# Patient Record
Sex: MALE | Race: WHITE | URBAN - METROPOLITAN AREA
[De-identification: names, ages, dates, MRNs, and addresses within clinical notes are randomized per-mention and may not be internally consistent; named-entity substitution may affect disease eponyms.]

---

## 2017-12-27 ENCOUNTER — HOSPITAL ENCOUNTER (OUTPATIENT)
Dept: LAB | Age: 37
Discharge: HOME | End: 2017-12-27
Payer: COMMERCIAL

## 2017-12-27 ENCOUNTER — OFFICE VISIT (OUTPATIENT)
Dept: INTERNAL MEDICINE CLINIC | Age: 37
End: 2017-12-27

## 2017-12-27 VITALS
DIASTOLIC BLOOD PRESSURE: 84 MMHG | WEIGHT: 225 LBS | SYSTOLIC BLOOD PRESSURE: 124 MMHG | RESPIRATION RATE: 16 BRPM | BODY MASS INDEX: 34.1 KG/M2 | OXYGEN SATURATION: 95 % | HEART RATE: 64 BPM | TEMPERATURE: 97.4 F | HEIGHT: 68 IN

## 2017-12-27 DIAGNOSIS — E78.2 MIXED HYPERLIPIDEMIA: ICD-10-CM

## 2017-12-27 DIAGNOSIS — Z00.00 PHYSICAL EXAM, ANNUAL: ICD-10-CM

## 2017-12-27 DIAGNOSIS — Z00.00 PHYSICAL EXAM, ANNUAL: Primary | ICD-10-CM

## 2017-12-27 DIAGNOSIS — Z23 ENCOUNTER FOR IMMUNIZATION: ICD-10-CM

## 2017-12-27 DIAGNOSIS — E66.9 OBESITY (BMI 30.0-34.9): ICD-10-CM

## 2017-12-27 DIAGNOSIS — L98.9 SKIN LESION OF FACE: ICD-10-CM

## 2017-12-27 LAB
ALBUMIN SERPL-MCNC: 3.9 G/DL (ref 3.4–5)
ALBUMIN/GLOB SERPL: 1.2 {RATIO} (ref 0.8–1.7)
ALP SERPL-CCNC: 52 U/L (ref 45–117)
ALT SERPL-CCNC: 78 U/L (ref 16–61)
ANION GAP SERPL CALC-SCNC: 8 MMOL/L (ref 3–18)
APPEARANCE UR: CLEAR
AST SERPL-CCNC: 37 U/L (ref 15–37)
BILIRUB SERPL-MCNC: 0.4 MG/DL (ref 0.2–1)
BILIRUB UR QL: NEGATIVE
BUN SERPL-MCNC: 14 MG/DL (ref 7–18)
BUN/CREAT SERPL: 14 (ref 12–20)
CALCIUM SERPL-MCNC: 8.8 MG/DL (ref 8.5–10.1)
CHLORIDE SERPL-SCNC: 105 MMOL/L (ref 100–108)
CHOLEST SERPL-MCNC: 258 MG/DL
CO2 SERPL-SCNC: 27 MMOL/L (ref 21–32)
COLOR UR: YELLOW
CREAT SERPL-MCNC: 1 MG/DL (ref 0.6–1.3)
ERYTHROCYTE [DISTWIDTH] IN BLOOD BY AUTOMATED COUNT: 12.6 % (ref 11.6–14.5)
GLOBULIN SER CALC-MCNC: 3.3 G/DL (ref 2–4)
GLUCOSE SERPL-MCNC: 84 MG/DL (ref 74–99)
GLUCOSE UR STRIP.AUTO-MCNC: NEGATIVE MG/DL
HCT VFR BLD AUTO: 46.1 % (ref 36–48)
HDLC SERPL-MCNC: 55 MG/DL (ref 40–60)
HDLC SERPL: 4.7 {RATIO} (ref 0–5)
HGB BLD-MCNC: 15.9 G/DL (ref 13–16)
HGB UR QL STRIP: NEGATIVE
KETONES UR QL STRIP.AUTO: NEGATIVE MG/DL
LDLC SERPL CALC-MCNC: 176.6 MG/DL (ref 0–100)
LEUKOCYTE ESTERASE UR QL STRIP.AUTO: NEGATIVE
LIPID PROFILE,FLP: ABNORMAL
MCH RBC QN AUTO: 30.5 PG (ref 24–34)
MCHC RBC AUTO-ENTMCNC: 34.5 G/DL (ref 31–37)
MCV RBC AUTO: 88.3 FL (ref 74–97)
NITRITE UR QL STRIP.AUTO: NEGATIVE
PH UR STRIP: 5.5 [PH] (ref 5–8)
PLATELET # BLD AUTO: 223 K/UL (ref 135–420)
PMV BLD AUTO: 10.4 FL (ref 9.2–11.8)
POTASSIUM SERPL-SCNC: 4.4 MMOL/L (ref 3.5–5.5)
PROT SERPL-MCNC: 7.2 G/DL (ref 6.4–8.2)
PROT UR STRIP-MCNC: NEGATIVE MG/DL
RBC # BLD AUTO: 5.22 M/UL (ref 4.7–5.5)
SODIUM SERPL-SCNC: 140 MMOL/L (ref 136–145)
SP GR UR REFRACTOMETRY: 1.03 (ref 1–1.03)
TRIGL SERPL-MCNC: 132 MG/DL (ref ?–150)
UROBILINOGEN UR QL STRIP.AUTO: 0.2 EU/DL (ref 0.2–1)
VLDLC SERPL CALC-MCNC: 26.4 MG/DL
WBC # BLD AUTO: 6.5 K/UL (ref 4.6–13.2)

## 2017-12-27 PROCEDURE — 85027 COMPLETE CBC AUTOMATED: CPT | Performed by: INTERNAL MEDICINE

## 2017-12-27 PROCEDURE — 36415 COLL VENOUS BLD VENIPUNCTURE: CPT | Performed by: INTERNAL MEDICINE

## 2017-12-27 PROCEDURE — 80061 LIPID PANEL: CPT | Performed by: INTERNAL MEDICINE

## 2017-12-27 PROCEDURE — 81003 URINALYSIS AUTO W/O SCOPE: CPT | Performed by: INTERNAL MEDICINE

## 2017-12-27 PROCEDURE — 80053 COMPREHEN METABOLIC PANEL: CPT | Performed by: INTERNAL MEDICINE

## 2017-12-27 RX ORDER — CETIRIZINE HCL 10 MG
10 TABLET ORAL DAILY
COMMUNITY

## 2017-12-27 NOTE — PROGRESS NOTES
HISTORY OF PRESENT ILLNESS  Darryle Aspen is a 40 y.o. male. HPI Comments: 39 yo male here for CPE. No complaints other than noting skin lesion on his forehead for the past few months. Has gained some weight since last year. Feels most of that is over the holidays. No CP, SOB. DBP a little elevated on arrival. Improved with manual check. Review of Systems   Constitutional: Negative for chills, fever and weight loss. HENT: Negative for congestion and ear pain. Eyes: Negative for blurred vision and pain. Respiratory: Negative for cough and shortness of breath. Cardiovascular: Negative for chest pain, palpitations and leg swelling. Gastrointestinal: Negative for nausea and vomiting. Genitourinary: Negative for frequency and urgency. Musculoskeletal: Negative for joint pain and myalgias. Skin: Negative for itching and rash. Neurological: Negative for dizziness, tingling and headaches. Psychiatric/Behavioral: Negative for depression. The patient is not nervous/anxious. Past Medical History:   Diagnosis Date    Elevated LDL cholesterol level     Tinea corporis      Current Outpatient Prescriptions on File Prior to Visit   Medication Sig Dispense Refill    fexofenadine (ALLEGRA) 180 mg tablet Take 1 Tab by mouth daily. 90 Tab 1     No current facility-administered medications on file prior to visit. Social History   Substance Use Topics    Smoking status: Never Smoker    Smokeless tobacco: Never Used    Alcohol use 1.2 oz/week     2 Cans of beer per week   No Known Allergies  Family History   Problem Relation Age of Onset    No Known Problems Mother     High Cholesterol Father      Physical Exam   Constitutional: He appears well-developed and well-nourished. No distress.    /84 (BP 1 Location: Right arm, BP Patient Position: Sitting)  Pulse 64  Temp 97.4 °F (36.3 °C) (Oral)   Resp 16  Ht 5' 8\" (1.727 m)  Wt 225 lb (102.1 kg)  SpO2 95%  BMI 34.21 kg/m2     HENT: Right Ear: Tympanic membrane, external ear and ear canal normal.   Left Ear: Tympanic membrane, external ear and ear canal normal.   Nose: No mucosal edema or rhinorrhea. Right sinus exhibits no maxillary sinus tenderness and no frontal sinus tenderness. Left sinus exhibits no maxillary sinus tenderness and no frontal sinus tenderness. Mouth/Throat: No oropharyngeal exudate or posterior oropharyngeal erythema. Eyes: Conjunctivae and EOM are normal. Pupils are equal, round, and reactive to light. Right eye exhibits no discharge. Left eye exhibits no discharge. No scleral icterus. Neck: Neck supple. Cardiovascular: Normal rate, regular rhythm and normal heart sounds. Exam reveals no gallop and no friction rub. No murmur heard. Pulmonary/Chest: Effort normal and breath sounds normal. No respiratory distress. He has no wheezes. He has no rales. Abdominal: Soft. He exhibits no distension. There is no tenderness. There is no rebound and no guarding. Musculoskeletal: He exhibits no edema or tenderness. Lymphadenopathy:     He has no cervical adenopathy. Neurological: He is alert. He has normal reflexes. He exhibits normal muscle tone. Skin: Skin is warm and dry. Psychiatric: He has a normal mood and affect. His behavior is normal.     Lab Results   Component Value Date/Time    Sodium 141 08/17/2016 08:20 AM    Potassium 4.3 08/17/2016 08:20 AM    Chloride 106 08/17/2016 08:20 AM    CO2 26 08/17/2016 08:20 AM    Anion gap 9 08/17/2016 08:20 AM    Glucose 93 08/17/2016 08:20 AM    BUN 15 08/17/2016 08:20 AM    Creatinine 1.12 08/17/2016 08:20 AM    BUN/Creatinine ratio 13 08/17/2016 08:20 AM    GFR est AA >60 08/17/2016 08:20 AM    GFR est non-AA >60 08/17/2016 08:20 AM    Calcium 8.6 08/17/2016 08:20 AM    Bilirubin, total 0.4 08/17/2016 08:20 AM    AST (SGOT) 27 08/17/2016 08:20 AM    Alk.  phosphatase 61 08/17/2016 08:20 AM    Protein, total 7.0 08/17/2016 08:20 AM    Albumin 3.8 08/17/2016 08:20 AM    Globulin 3.2 08/17/2016 08:20 AM    A-G Ratio 1.2 08/17/2016 08:20 AM    ALT (SGPT) 58 08/17/2016 08:20 AM     Lab Results   Component Value Date/Time    Cholesterol, total 245 11/21/2016 10:22 AM    HDL Cholesterol 60 11/21/2016 10:22 AM    LDL, calculated 159.4 11/21/2016 10:22 AM    VLDL, calculated 25.6 11/21/2016 10:22 AM    Triglyceride 128 11/21/2016 10:22 AM    CHOL/HDL Ratio 4.1 11/21/2016 10:22 AM     ASSESSMENT and PLAN    ICD-10-CM ICD-9-CM    1. Physical exam, annual Z00.00 V70.0 CBC W/O DIFF      METABOLIC PANEL, COMPREHENSIVE      LIPID PANEL      URINALYSIS W/ RFLX MICROSCOPIC   2. Encounter for immunization Z23 V03.89 INFLUENZA VIRUS VAC QUAD,SPLIT,PRESV FREE SYRINGE IM   3. Mixed hyperlipidemia E78.2 272.2    4. Obesity (BMI 30.0-34. 9) E66.9 278.00    5. Skin lesion of face L98.9 709.9 REFERRAL TO DERMATOLOGY     Repeat labs today. Discussed lifestyle changes, weight loss. FLu shot today. Referral entered for dermatology to evaluate forehead lesion.

## 2017-12-27 NOTE — PROGRESS NOTES
Henny Ha presents today for   Chief Complaint   Patient presents with    Complete Physical       Henny Ha preferred language for health care discussion is english/other. Is someone accompanying this pt? no    Is the patient using any DME equipment during OV? no    Depression Screening:  PHQ over the last two weeks 12/27/2017 12/15/2016 11/17/2016 8/15/2016   Little interest or pleasure in doing things Not at all Not at all Not at all Not at all   Feeling down, depressed or hopeless Not at all Not at all Not at all Not at all   Total Score PHQ 2 0 0 0 0       Learning Assessment:  Learning Assessment 8/15/2016   PRIMARY LEARNER Patient   HIGHEST LEVEL OF EDUCATION - PRIMARY LEARNER  4 YEARS [facility] PRIMARY LEARNER NONE   CO-LEARNER CAREGIVER No   PRIMARY LANGUAGE ENGLISH    NEED No   LEARNER PREFERENCE PRIMARY DEMONSTRATION   LEARNING SPECIAL TOPICS no   ANSWERED BY najmanet   RELATIONSHIP SELF   ASSESSMENT COMMENT none       Abuse Screening:  No flowsheet data found. Fall Risk  No flowsheet data found. Health Maintenance reviewed and discussed per provider. Yes    Henny Ha is due for TDAP vax, influenza vax. Please order/place referral if appropriate. Advance Directive:  1. Do you have an advance directive in place? Patient Reply: no    2. If not, would you like material regarding how to put one in place? Patient Reply: no    Coordination of Care:  1. Have you been to the ER, urgent care clinic since your last visit? Hospitalized since your last visit? no    2. Have you seen or consulted any other health care providers outside of the [address] [place] [address] since your last visit? Include any pap smears or colon screening. no      Immunization History   Administered Date(s) Administered    Influenza Vaccine (Quad) PF 12/27/2017     Immunization administered 12/27/2017 by Preston Fuentes LPN with pt's consent. Patient tolerated procedure well.   Pt. Observed for 10 mins. No reactions noted/reported.  VIS given

## 2017-12-27 NOTE — PATIENT INSTRUCTIONS
Body Mass Index: Care Instructions  Your Care Instructions    Body mass index (BMI) can help you see if your weight is raising your risk for health problems. It uses a formula to compare how much you weigh with how tall you are. · A BMI lower than 18.5 is considered underweight. · A BMI between 18.5 and 24.9 is considered healthy. · A BMI between 25 and 29.9 is considered overweight. A BMI of 30 or higher is considered obese. If your BMI is in the normal range, it means that you have a lower risk for weight-related health problems. If your BMI is in the overweight or obese range, you may be at increased risk for weight-related health problems, such as high blood pressure, heart disease, stroke, arthritis or joint pain, and diabetes. If your BMI is in the underweight range, you may be at increased risk for health problems such as fatigue, lower protection (immunity) against illness, muscle loss, bone loss, hair loss, and hormone problems. BMI is just one measure of your risk for weight-related health problems. You may be at higher risk for health problems if you are not active, you eat an unhealthy diet, or you drink too much alcohol or use tobacco products. Follow-up care is a key part of your treatment and safety. Be sure to make and go to all appointments, and call your doctor if you are having problems. It's also a good idea to know your test results and keep a list of the medicines you take. How can you care for yourself at home? · Practice healthy eating habits. This includes eating plenty of fruits, vegetables, whole grains, lean protein, and low-fat dairy. · If your doctor recommends it, get more exercise. Walking is a good choice. Bit by bit, increase the amount you walk every day. Try for at least 30 minutes on most days of the week. · Do not smoke. Smoking can increase your risk for health problems. If you need help quitting, talk to your doctor about stop-smoking programs and medicines. These can increase your chances of quitting for good. · Limit alcohol to 2 drinks a day for men and 1 drink a day for women. Too much alcohol can cause health problems. If you have a BMI higher than 25  · Your doctor may do other tests to check your risk for weight-related health problems. This may include measuring the distance around your waist. A waist measurement of more than 40 inches in men or 35 inches in women can increase the risk of weight-related health problems. · Talk with your doctor about steps you can take to stay healthy or improve your health. You may need to make lifestyle changes to lose weight and stay healthy, such as changing your diet and getting regular exercise. If you have a BMI lower than 18.5  · Your doctor may do other tests to check your risk for health problems. · Talk with your doctor about steps you can take to stay healthy or improve your health. You may need to make lifestyle changes to gain or maintain weight and stay healthy, such as getting more healthy foods in your diet and doing exercises to build muscle. Where can you learn more? Go to http://marie-moraima.info/. Enter S176 in the search box to learn more about \"Body Mass Index: Care Instructions. \"  Current as of: October 13, 2016  Content Version: 11.4  © 7757-7105 Healthwise, Incorporated. Care instructions adapted under license by [employer] (which disclaims liability or warranty for this information). If you have questions about a medical condition or this instruction, always ask your healthcare professional. [place] [address] any warranty or liability for your use of this information. Starting a Weight Loss Plan: Care Instructions  Your Care Instructions    If you are thinking about losing weight, it can be hard to know where to start. Your doctor can help you set up a weight loss plan that best meets your needs.  You may want to take a class on nutrition or exercise, or join a weight loss support group. If you have questions about how to make changes to your eating or exercise habits, ask your doctor about seeing a registered dietitian or an exercise specialist.  It can be a big challenge to lose weight. But you do not have to make huge changes at once. Make small changes, and stick with them. When those changes become habit, add a few more changes. If you do not think you are ready to make changes right now, try to pick a date in the future. Make an appointment to see your doctor to discuss whether the time is right for you to start a plan. Follow-up care is a key part of your treatment and safety. Be sure to make and go to all appointments, and call your doctor if you are having problems. It's also a good idea to know your test results and keep a list of the medicines you take. How can you care for yourself at home? · Set realistic goals. Many people expect to lose much more weight than is likely. A weight loss of 5% to 10% of your body weight may be enough to improve your health. · Get family and friends involved to provide support. Talk to them about why you are trying to lose weight, and ask them to help. They can help by participating in exercise and having meals with you, even if they may be eating something different. · Find what works best for you. If you do not have time or do not like to cook, a program that offers meal replacement bars or shakes may be better for you. Or if you like to prepare meals, finding a plan that includes daily menus and recipes may be best.  · Ask your doctor about other health professionals who can help you achieve your weight loss goals. ¨ A dietitian can help you make healthy changes in your diet.   ¨ An exercise specialist or  can help you develop a safe and effective exercise program.  ¨ A counselor or psychiatrist can help you cope with issues such as depression, anxiety, or family problems that can make it hard to focus on weight loss. · Consider joining a support group for people who are trying to lose weight. Your doctor can suggest groups in your area. Where can you learn more? Go to http://marie-moraima.info/. Enter B791 in the search box to learn more about \"Starting a Weight Loss Plan: Care Instructions. \"  Current as of: October 13, 2016  Content Version: 11.4  © 8559-6775 [employer]. Care instructions adapted under license by [employer] (which disclaims liability or warranty for this information). If you have questions about a medical condition or this instruction, always ask your healthcare professional. [address] any warranty or liability for your use of this information.

## 2017-12-27 NOTE — MR AVS SNAPSHOT
Visit Information Date & Time Provider Department Dept. Phone Encounter #  
 12/27/2017  8:30 AM Mary Alcantara LATTO [phone] [id number] Follow-up Instructions Return in about 1 year (around 12/27/2018), or if symptoms worsen or fail to improve. Upcoming Health Maintenance Date Due DTaP/Tdap/Td series (1 - Tdap) 5/4/2002 Allergies as of 12/27/2017  Review Complete On: 12/27/2017 By: Nohemi Olivares LPN No Known Allergies Current Immunizations  Never Reviewed Name Date Influenza Vaccine (Quad) PF  Incomplete Not reviewed this visit You Were Diagnosed With   
  
 Codes Comments Physical exam, annual    -  Primary ICD-10-CM: Z00.00 ICD-9-CM: V70.0 Encounter for immunization     ICD-10-CM: C20 ICD-9-CM: V03.89 Mixed hyperlipidemia     ICD-10-CM: E78.2 ICD-9-CM: 272.2 Obesity (BMI 30.0-34.9)     ICD-10-CM: M24.5 ICD-9-CM: 278.00 Skin lesion of face     ICD-10-CM: L98.9 ICD-9-CM: 709.9 Vitals BP Pulse Temp Resp Height(growth percentile) Weight(growth percentile) 127/90 (BP 1 Location: Right arm, BP Patient Position: Sitting) 64 97.4 °F (36.3 °C) (Oral) 16 5' 8\" (1.727 m) 225 lb (102.1 kg) SpO2 BMI Smoking Status 95% 34.21 kg/m2 Never Smoker Vitals History BMI and BSA Data Body Mass Index Body Surface Area  
 34.21 kg/m 2 2.21 m 2 Preferred Pharmacy Pharmacy Name Phone [id number], [address] [facility] [place] [phone] Your Updated Medication List  
  
   
This list is accurate as of: 12/27/17  9:13 AM.  Always use your most recent med list.  
  
  
  
  
 cetirizine 5 mg tablet Commonly known as:  ZYRTEC Take  by mouth. fexofenadine 180 mg tablet Commonly known as:  Carletha Nipper Take 1 Tab by mouth daily. We Performed the Following INFLUENZA VIRUS VAC QUAD,SPLIT,PRESV FREE SYRINGE IM [id number] CPT(R)] REFERRAL TO DERMATOLOGY [REF19 Custom] Comments:  
 Please evaluate patient for skin lesion, forehead. Follow-up Instructions Return in about 1 year (around 12/27/2018), or if symptoms worsen or fail to improve. To-Do List   
 12/27/2017 Lab:  CBC W/O DIFF   
  
 12/27/2017 Lab:  LIPID PANEL   
  
 12/27/2017 Lab:  METABOLIC PANEL, COMPREHENSIVE   
  
 12/27/2017 Lab:  URINALYSIS W/ RFLX MICROSCOPIC Referral Information Referral ID Referred By Referred To  
  
 [id number] Claudia RAMIREZ MD   
   [address] [facility] [place] Phone: [phone] Fax: [phone] Visits Status Start Date End Date 1 New Request 12/27/17 12/27/18 If your referral has a status of pending review or denied, additional information will be sent to support the outcome of this decision. Patient Instructions Body Mass Index: Care Instructions Your Care Instructions Body mass index (BMI) can help you see if your weight is raising your risk for health problems. It uses a formula to compare how much you weigh with how tall you are. · A BMI lower than 18.5 is considered underweight. · A BMI between 18.5 and 24.9 is considered healthy. · A BMI between 25 and 29.9 is considered overweight. A BMI of 30 or higher is considered obese. If your BMI is in the normal range, it means that you have a lower risk for weight-related health problems. If your BMI is in the overweight or obese range, you may be at increased risk for weight-related health problems, such as high blood pressure, heart disease, stroke, arthritis or joint pain, and diabetes.  If your BMI is in the underweight range, you may be at increased risk for health problems such as fatigue, lower protection (immunity) against illness, muscle loss, bone loss, hair loss, and hormone problems. BMI is just one measure of your risk for weight-related health problems. You may be at higher risk for health problems if you are not active, you eat an unhealthy diet, or you drink too much alcohol or use tobacco products. Follow-up care is a key part of your treatment and safety. Be sure to make and go to all appointments, and call your doctor if you are having problems. It's also a good idea to know your test results and keep a list of the medicines you take. How can you care for yourself at home? · Practice healthy eating habits. This includes eating plenty of fruits, vegetables, whole grains, lean protein, and low-fat dairy. · If your doctor recommends it, get more exercise. Walking is a good choice. Bit by bit, increase the amount you walk every day. Try for at least 30 minutes on most days of the week. · Do not smoke. Smoking can increase your risk for health problems. If you need help quitting, talk to your doctor about stop-smoking programs and medicines. These can increase your chances of quitting for good. · Limit alcohol to 2 drinks a day for men and 1 drink a day for women. Too much alcohol can cause health problems. If you have a BMI higher than 25 · Your doctor may do other tests to check your risk for weight-related health problems. This may include measuring the distance around your waist. A waist measurement of more than 40 inches in men or 35 inches in women can increase the risk of weight-related health problems. · Talk with your doctor about steps you can take to stay healthy or improve your health. You may need to make lifestyle changes to lose weight and stay healthy, such as changing your diet and getting regular exercise. If you have a BMI lower than 18.5 · Your doctor may do other tests to check your risk for health problems. · Talk with your doctor about steps you can take to stay healthy or improve your health.  You may need to make lifestyle changes to gain or maintain weight and stay healthy, such as getting more healthy foods in your diet and doing exercises to build muscle. Where can you learn more? Go to http://marie-moraima.info/. Enter S176 in the search box to learn more about \"Body Mass Index: Care Instructions. \" Current as of: October 13, 2016 Content Version: 11.4 © 7059-6389 [employer]. Care instructions adapted under license by [employer] (which disclaims liability or warranty for this information). If you have questions about a medical condition or this instruction, always ask your healthcare professional. [address] any warranty or liability for your use of this information. Starting a Weight Loss Plan: Care Instructions Your Care Instructions If you are thinking about losing weight, it can be hard to know where to start. Your doctor can help you set up a weight loss plan that best meets your needs. You may want to take a class on nutrition or exercise, or join a weight loss support group. If you have questions about how to make changes to your eating or exercise habits, ask your doctor about seeing a registered dietitian or an exercise specialist. 
It can be a big challenge to lose weight. But you do not have to make huge changes at once. Make small changes, and stick with them. When those changes become habit, add a few more changes. If you do not think you are ready to make changes right now, try to pick a date in the future. Make an appointment to see your doctor to discuss whether the time is right for you to start a plan. Follow-up care is a key part of your treatment and safety. Be sure to make and go to all appointments, and call your doctor if you are having problems. It's also a good idea to know your test results and keep a list of the medicines you take. How can you care for yourself at home? · Set realistic goals. Many people expect to lose much more weight than is likely. A weight loss of 5% to 10% of your body weight may be enough to improve your health. · Get family and friends involved to provide support. Talk to them about why you are trying to lose weight, and ask them to help. They can help by participating in exercise and having meals with you, even if they may be eating something different. · Find what works best for you. If you do not have time or do not like to cook, a program that offers meal replacement bars or shakes may be better for you. Or if you like to prepare meals, finding a plan that includes daily menus and recipes may be best. 
· Ask your doctor about other health professionals who can help you achieve your weight loss goals. ¨ A dietitian can help you make healthy changes in your diet. ¨ An exercise specialist or  can help you develop a safe and effective exercise program. 
¨ A counselor or psychiatrist can help you cope with issues such as depression, anxiety, or family problems that can make it hard to focus on weight loss. · Consider joining a support group for people who are trying to lose weight. Your doctor can suggest groups in your area. Where can you learn more? Go to http://marieQiyou Interaction Networkmoraima.info/. Enter O562 in the search box to learn more about \"Starting a Weight Loss Plan: Care Instructions. \" Current as of: October 13, 2016 Content Version: 11.4 © 9886-6109 Healthwise, Incorporated. Care instructions adapted under license by [employer] (which disclaims liability or warranty for this information). If you have questions about a medical condition or this instruction, always ask your healthcare professional. [place] [address] any warranty or liability for your use of this information. Introducing [facility]! Dear Estrella Chowdhury: Thank you for requesting a RelateIQ account. Our records indicate that you already have an active RelateIQ account. You can access your account anytime at [email or web address] Did you know that you can access your hospital and ER discharge instructions at any time in RelateIQ? You can also review all of your test results from your hospital stay or ER visit. Additional Information If you have questions, please visit the Frequently Asked Questions section of the RelateIQ website at [email or web address] Remember, RelateIQ is NOT to be used for urgent needs. For medical emergencies, dial 911. Now available from your iPhone and Android! Please provide this summary of care documentation to your next provider. If you have any questions after today's visit, please call [phone].

## 2018-12-27 ENCOUNTER — OFFICE VISIT (OUTPATIENT)
Dept: INTERNAL MEDICINE CLINIC | Age: 38
End: 2018-12-27

## 2018-12-27 ENCOUNTER — HOSPITAL ENCOUNTER (OUTPATIENT)
Dept: LAB | Age: 38
Discharge: HOME | End: 2018-12-27
Payer: COMMERCIAL

## 2018-12-27 VITALS
TEMPERATURE: 97.6 F | WEIGHT: 225.2 LBS | RESPIRATION RATE: 18 BRPM | HEART RATE: 66 BPM | DIASTOLIC BLOOD PRESSURE: 71 MMHG | OXYGEN SATURATION: 97 % | HEIGHT: 68 IN | BODY MASS INDEX: 34.13 KG/M2 | SYSTOLIC BLOOD PRESSURE: 119 MMHG

## 2018-12-27 DIAGNOSIS — G47.9 SLEEP DISTURBANCE: ICD-10-CM

## 2018-12-27 DIAGNOSIS — J30.1 SEASONAL ALLERGIC RHINITIS DUE TO POLLEN: ICD-10-CM

## 2018-12-27 DIAGNOSIS — Z00.00 PHYSICAL EXAM: ICD-10-CM

## 2018-12-27 DIAGNOSIS — E78.2 MIXED HYPERLIPIDEMIA: ICD-10-CM

## 2018-12-27 DIAGNOSIS — Z00.00 PHYSICAL EXAM: Primary | ICD-10-CM

## 2018-12-27 LAB
ALBUMIN SERPL-MCNC: 4 G/DL (ref 3.4–5)
ALBUMIN/GLOB SERPL: 1.3 {RATIO} (ref 0.8–1.7)
ALP SERPL-CCNC: 49 U/L (ref 45–117)
ALT SERPL-CCNC: 75 U/L (ref 16–61)
ANION GAP SERPL CALC-SCNC: 4 MMOL/L (ref 3–18)
AST SERPL-CCNC: 37 U/L (ref 15–37)
BASOPHILS # BLD: 0 K/UL (ref 0–0.1)
BASOPHILS NFR BLD: 0 % (ref 0–2)
BILIRUB SERPL-MCNC: 0.5 MG/DL (ref 0.2–1)
BUN SERPL-MCNC: 18 MG/DL (ref 7–18)
BUN/CREAT SERPL: 17 (ref 12–20)
CALCIUM SERPL-MCNC: 9.2 MG/DL (ref 8.5–10.1)
CHLORIDE SERPL-SCNC: 108 MMOL/L (ref 100–108)
CHOLEST SERPL-MCNC: 269 MG/DL
CO2 SERPL-SCNC: 27 MMOL/L (ref 21–32)
CREAT SERPL-MCNC: 1.04 MG/DL (ref 0.6–1.3)
DIFFERENTIAL METHOD BLD: NORMAL
EOSINOPHIL # BLD: 0.1 K/UL (ref 0–0.4)
EOSINOPHIL NFR BLD: 1 % (ref 0–5)
ERYTHROCYTE [DISTWIDTH] IN BLOOD BY AUTOMATED COUNT: 12.3 % (ref 11.6–14.5)
EST. AVERAGE GLUCOSE BLD GHB EST-MCNC: 114 MG/DL
GLOBULIN SER CALC-MCNC: 3.2 G/DL (ref 2–4)
GLUCOSE SERPL-MCNC: 96 MG/DL (ref 74–99)
HBA1C MFR BLD: 5.6 % (ref 4.2–5.6)
HCT VFR BLD AUTO: 45.7 % (ref 36–48)
HDLC SERPL-MCNC: 53 MG/DL (ref 40–60)
HDLC SERPL: 5.1 {RATIO} (ref 0–5)
HGB BLD-MCNC: 15.7 G/DL (ref 13–16)
LDLC SERPL CALC-MCNC: 179.8 MG/DL (ref 0–100)
LIPID PROFILE,FLP: ABNORMAL
LYMPHOCYTES # BLD: 1.3 K/UL (ref 0.9–3.6)
LYMPHOCYTES NFR BLD: 22 % (ref 21–52)
MCH RBC QN AUTO: 30.8 PG (ref 24–34)
MCHC RBC AUTO-ENTMCNC: 34.4 G/DL (ref 31–37)
MCV RBC AUTO: 89.8 FL (ref 74–97)
MONOCYTES # BLD: 0.3 K/UL (ref 0.05–1.2)
MONOCYTES NFR BLD: 5 % (ref 3–10)
NEUTS SEG # BLD: 4.3 K/UL (ref 1.8–8)
NEUTS SEG NFR BLD: 72 % (ref 40–73)
PLATELET # BLD AUTO: 218 K/UL (ref 135–420)
PMV BLD AUTO: 10.6 FL (ref 9.2–11.8)
POTASSIUM SERPL-SCNC: 4.6 MMOL/L (ref 3.5–5.5)
PROT SERPL-MCNC: 7.2 G/DL (ref 6.4–8.2)
RBC # BLD AUTO: 5.09 M/UL (ref 4.7–5.5)
SODIUM SERPL-SCNC: 139 MMOL/L (ref 136–145)
TRIGL SERPL-MCNC: 181 MG/DL (ref ?–150)
TSH SERPL DL<=0.05 MIU/L-ACNC: 1.5 UIU/ML (ref 0.36–3.74)
VLDLC SERPL CALC-MCNC: 36.2 MG/DL
WBC # BLD AUTO: 6 K/UL (ref 4.6–13.2)

## 2018-12-27 PROCEDURE — 80053 COMPREHEN METABOLIC PANEL: CPT

## 2018-12-27 PROCEDURE — 84443 ASSAY THYROID STIM HORMONE: CPT

## 2018-12-27 PROCEDURE — 83036 HEMOGLOBIN GLYCOSYLATED A1C: CPT

## 2018-12-27 PROCEDURE — 85025 COMPLETE CBC W/AUTO DIFF WBC: CPT

## 2018-12-27 PROCEDURE — 80061 LIPID PANEL: CPT

## 2018-12-27 RX ORDER — MONTELUKAST SODIUM 10 MG/1
10 TABLET ORAL DAILY
Qty: 60 TAB | Refills: 5 | Status: SHIPPED | OUTPATIENT
Start: 2018-12-27 | End: 2019-01-04

## 2018-12-27 NOTE — PROGRESS NOTES
ROOM # 16  Identified pt with two pt identifiers(name and ). Reviewed record in preparation for visit and have obtained necessary documentation. Chief Complaint   Patient presents with    Complete Physical      Olena Caldwell preferred language for health care discussion is english/other. Is the patient using any DME equipment during OV? NO    Franciscoapple Caldwell is due for:  Health Maintenance Due   Topic    DTaP/Tdap/Td series (1 - Tdap)    Influenza Age 5 to Adult      Health Maintenance reviewed and discussed per provider  Please order/place referral if appropriate. Advance Directive:  1. Do you have an advance directive in place? Patient Reply: NO    2. If not, would you like material regarding how to put one in place? NO    Coordination of Care:  1. Have you been to the ER, urgent care clinic since your last visit? Hospitalized since your last visit? NO    2. Have you seen or consulted any other health care providers outside of the [address] [place] [address] since your last visit? Include any pap smears or colon screening. NO    Patient is accompanied by self I have received verbal consent from Olena Caldwell to discuss any/all medical information while they are present in the room.     Learning Assessment:  Learning Assessment 8/15/2016   PRIMARY LEARNER Patient   HIGHEST LEVEL OF EDUCATION - PRIMARY LEARNER  4 YEARS OF COLLEGE   BARRIERS PRIMARY LEARNER NONE   CO-LEARNER CAREGIVER No   PRIMARY LANGUAGE ENGLISH    NEED No   LEARNER PREFERENCE PRIMARY DEMONSTRATION   LEARNING SPECIAL TOPICS no   ANSWERED BY patinet   RELATIONSHIP SELF   ASSESSMENT COMMENT none     Depression Screening:  PHQ over the last two weeks 2018 2017 12/15/2016 2016 8/15/2016   Little interest or pleasure in doing things Not at all Not at all Not at all Not at all Not at all   Feeling down, depressed, irritable, or hopeless Not at all Not at all Not at all Not at all Not at all   Total Score PHQ 2 0 0 0 0 0 Abuse Screening:  No flowsheet data found. Fall Risk  No flowsheet data found.

## 2018-12-27 NOTE — PATIENT INSTRUCTIONS

## 2018-12-27 NOTE — PROGRESS NOTES
HISTORY OF PRESENT ILLNESS  Suhas Leach is a 41 y.o. male. 40 yo male here for CPE. No complaints. Does get seasonal allergies. Does not feel zyrtec is fully effective. Allegra didn't help much. Had nasal dryness with Flonase. Has not gained weight since last year. Notes he goes to the gym regularly including weight training. Lives alone, but has noted snoring in past. Feels like he wakes himself up at times. Intermittent nonrestorative sleep. Has already received flu shot. No known FH of colon cancer. Elevated LDL on past labs. BP elevated in the past but well controlled today. Review of Systems   Constitutional: Negative for chills, fever and weight loss. HENT: Negative for congestion and ear pain. Eyes: Negative for blurred vision and pain. Respiratory: Negative for cough and shortness of breath. Cardiovascular: Negative for chest pain, palpitations and leg swelling. Gastrointestinal: Negative for nausea and vomiting. Genitourinary: Negative for frequency and urgency. Musculoskeletal: Negative for joint pain and myalgias. Skin: Negative for itching and rash. Neurological: Negative for dizziness, tingling and headaches. Psychiatric/Behavioral: Negative for depression. The patient is not nervous/anxious. Past Medical History:   Diagnosis Date    Elevated LDL cholesterol level     Tinea corporis    History reviewed. No pertinent surgical history. Current Outpatient Medications on File Prior to Visit   Medication Sig Dispense Refill    cetirizine (ZYRTEC) 5 mg tablet Take  by mouth. No current facility-administered medications on file prior to visit. No Known Allergies   Family History   Problem Relation Age of Onset    No Known Problems Mother     High Cholesterol Father      Social History     Tobacco Use    Smoking status: Never Smoker    Smokeless tobacco: Never Used   Substance Use Topics    Alcohol use:  Yes     Alcohol/week: 1.2 oz     Types: 2 Cans of beer per week    Drug use: No     Physical Exam   Constitutional: He appears well-developed and well-nourished. No distress. /71 (BP 1 Location: Right arm, BP Patient Position: Sitting)   Pulse 66   Temp 97.6 °F (36.4 °C) (Oral)   Resp 18   Ht 5' 8\" (1.727 m)   Wt 225 lb 3.2 oz (102.2 kg)   SpO2 97%   BMI 34.24 kg/m²      HENT:   Right Ear: Tympanic membrane and ear canal normal.   Left Ear: Tympanic membrane and ear canal normal.   Nose: No mucosal edema. Right sinus exhibits no maxillary sinus tenderness and no frontal sinus tenderness. Left sinus exhibits no maxillary sinus tenderness and no frontal sinus tenderness. Mallampati III   Eyes: EOM are normal. Right eye exhibits no discharge. Left eye exhibits no discharge. No scleral icterus. Neck: Neck supple. Cardiovascular: Normal rate, regular rhythm and normal heart sounds. Exam reveals no gallop and no friction rub. No murmur heard. Pulmonary/Chest: Effort normal and breath sounds normal. No respiratory distress. He has no wheezes. He has no rales. Abdominal: Soft. Bowel sounds are normal. He exhibits no distension. There is no tenderness. There is no rebound and no guarding. Musculoskeletal: He exhibits no edema or tenderness. Lymphadenopathy:     He has no cervical adenopathy. Neurological: He is alert. He exhibits normal muscle tone. Skin: Skin is warm and dry. Psychiatric: He has a normal mood and affect.  His behavior is normal.     Lab Results   Component Value Date/Time    Cholesterol, total 258 (H) 12/27/2017 09:16 AM    HDL Cholesterol 55 12/27/2017 09:16 AM    LDL, calculated 176.6 (H) 12/27/2017 09:16 AM    VLDL, calculated 26.4 12/27/2017 09:16 AM    Triglyceride 132 12/27/2017 09:16 AM    CHOL/HDL Ratio 4.7 12/27/2017 09:16 AM     Lab Results   Component Value Date/Time    Sodium 140 12/27/2017 09:16 AM    Potassium 4.4 12/27/2017 09:16 AM    Chloride 105 12/27/2017 09:16 AM    CO2 27 12/27/2017 09:16 AM Anion gap 8 12/27/2017 09:16 AM    Glucose 84 12/27/2017 09:16 AM    BUN 14 12/27/2017 09:16 AM    Creatinine 1.00 12/27/2017 09:16 AM    BUN/Creatinine ratio 14 12/27/2017 09:16 AM    GFR est AA >60 12/27/2017 09:16 AM    GFR est non-AA >60 12/27/2017 09:16 AM    Calcium 8.8 12/27/2017 09:16 AM    Bilirubin, total 0.4 12/27/2017 09:16 AM    AST (SGOT) 37 12/27/2017 09:16 AM    Alk. phosphatase 52 12/27/2017 09:16 AM    Protein, total 7.2 12/27/2017 09:16 AM    Albumin 3.9 12/27/2017 09:16 AM    Globulin 3.3 12/27/2017 09:16 AM    A-G Ratio 1.2 12/27/2017 09:16 AM    ALT (SGPT) 78 (H) 12/27/2017 09:16 AM   No results found for: HBA1C, HGBE8, MVS1OOHC, EIF8OCVZ  No results found for: TSH, TSHEXT, TSH2, TSH3, TSHP, TSHELE, TT3, T3U, T3UP, FRT3, FT3, T3T, FT4, FT4P, T4, T4P, FT4T, TT7, U4IHWHYYK, TSHEXT  Lab Results   Component Value Date/Time    WBC 6.5 12/27/2017 09:16 AM    HGB 15.9 12/27/2017 09:16 AM    HCT 46.1 12/27/2017 09:16 AM    PLATELET 652 00/97/8554 09:16 AM    MCV 88.3 12/27/2017 09:16 AM     ASSESSMENT and PLAN    ICD-10-CM ICD-9-CM    1. Physical exam Z00.00 V70.9 CBC WITH AUTOMATED DIFF      METABOLIC PANEL, COMPREHENSIVE      LIPID PANEL      TSH 3RD GENERATION      HEMOGLOBIN A1C WITH EAG   2. Sleep disturbance G47.9 780.50 SLEEP MEDICINE REFERRAL   3. Mixed hyperlipidemia H14.8 863.8 METABOLIC PANEL, COMPREHENSIVE      LIPID PANEL   4. Seasonal allergic rhinitis due to pollen J30.1 477.0    5. BMI 34.0-34.9,adult Z68.34 V85.34 TSH 3RD GENERATION     Repeat labs today. Continue with lifestyle changes. Will try adding singulair to help with allergy sx  Referral entered to sleep medicine.

## 2018-12-28 NOTE — PROGRESS NOTES
Please let him know his cholesterol levels remain elevated. Would he be interested in starting a medication such as simvastatin? Also, one of his liver enzymes remains a little elevated. He should limit alcohol intake. We can further assess his liver with an ultrasound if he is willing to do so.

## 2018-12-31 ENCOUNTER — TELEPHONE (OUTPATIENT)
Dept: INTERNAL MEDICINE CLINIC | Age: 38
End: 2018-12-31

## 2018-12-31 DIAGNOSIS — R79.89 ELEVATED LFTS: ICD-10-CM

## 2018-12-31 DIAGNOSIS — E78.5 DYSLIPIDEMIA: Primary | ICD-10-CM

## 2018-12-31 RX ORDER — SIMVASTATIN 10 MG/1
10 TABLET, FILM COATED ORAL
Qty: 30 TAB | Refills: 3 | Status: SHIPPED | OUTPATIENT
Start: 2018-12-31 | End: 2019-04-22 | Stop reason: SDUPTHER

## 2018-12-31 NOTE — TELEPHONE ENCOUNTER
2 Pt Identifiers verified. Notified pt of below result note. Verbalized understanding. Pt states he is willing to start zocor and having an US.

## 2018-12-31 NOTE — TELEPHONE ENCOUNTER
----- Message from Reji Zhang MD sent at 12/28/2018  3:04 PM EST -----  Please let him know his cholesterol levels remain elevated. Would he be interested in starting a medication such as simvastatin? Also, one of his liver enzymes remains a little elevated. He should limit alcohol intake. We can further assess his liver with an ultrasound if he is willing to do so.

## 2018-12-31 NOTE — TELEPHONE ENCOUNTER
Sent electronically:    Requested Prescriptions     Signed Prescriptions Disp Refills    simvastatin (ZOCOR) 10 mg tablet 30 Tab 3     Sig: Take 1 Tab by mouth nightly. Authorizing Provider: Mono Narayanan     Order in [facility] for RUQ ultrasound. He will need to f/u with Dr. Sae Munoz in 6 weeks to check on lipids. He needs fasting labs prior to appt. Orders in [facility].

## 2019-01-07 ENCOUNTER — TELEPHONE (OUTPATIENT)
Dept: INTERNAL MEDICINE CLINIC | Age: 39
End: 2019-01-07

## 2019-01-07 NOTE — TELEPHONE ENCOUNTER
Patient is requesting order for ultrasound be sent to [facility] center in [facility]

## 2019-01-10 ENCOUNTER — TELEPHONE (OUTPATIENT)
Dept: INTERNAL MEDICINE CLINIC | Age: 39
End: 2019-01-10

## 2019-01-10 NOTE — TELEPHONE ENCOUNTER
Patient is calling in stating the Dx center did not receive the fax, asking that it please be resent. (Please see telephone encounter from 1/7/19).

## 2019-01-11 NOTE — TELEPHONE ENCOUNTER
Outgoing call to patient this morning. Two patient identifies confirmed. Patient requesting ultrasound order be faxed to [facility] at [facility]. Outgoing call to facility. Verified fax number. Order faxed to [phone].

## 2019-03-18 ENCOUNTER — OFFICE VISIT (OUTPATIENT)
Dept: PULMONOLOGY | Age: 39
End: 2019-03-18

## 2019-03-18 VITALS
SYSTOLIC BLOOD PRESSURE: 130 MMHG | HEIGHT: 68 IN | DIASTOLIC BLOOD PRESSURE: 84 MMHG | BODY MASS INDEX: 33.49 KG/M2 | WEIGHT: 221 LBS | TEMPERATURE: 98 F | OXYGEN SATURATION: 98 % | RESPIRATION RATE: 16 BRPM | HEART RATE: 48 BPM

## 2019-03-18 DIAGNOSIS — R06.83 SNORING: ICD-10-CM

## 2019-03-18 DIAGNOSIS — E66.9 OBESITY (BMI 30-39.9): ICD-10-CM

## 2019-03-18 DIAGNOSIS — Z78.9 ALCOHOL USE: ICD-10-CM

## 2019-03-18 DIAGNOSIS — E78.5 DYSLIPIDEMIA: ICD-10-CM

## 2019-03-18 DIAGNOSIS — F45.8 BRUXISM: Primary | ICD-10-CM

## 2019-03-18 NOTE — PROGRESS NOTES
[facility]  Pulmonary, Critical Care, and Sleep Medicine    Office Progress Note- Initial Evaluation      Primary Care Physician: Trent Hubbard MD     Reason for Visit:  Evaluation for for possible sleep disorder. Assessment:  1. Snoring- Patient has symptoms and exam findings suggestive of a sleep breathing disorder. He denies fatigue but also is taking daily naps. His father has a similar build and was diagnosed with JANINA. As such additional sleep testing is indicated. 2. Hyperlipidemia  3. Bruxism- Has oral guard  4. Obesity: Body mass index is 33.6 kg/m². 5. Seasonal Allergies- controlled  6. Alcohol use: on weekends  7. Report of recurrent nosebleeds- monitor for now       Plan:    · Schedule patient for home sleep study for further evaluation. · Potential consequences of untreated sleep apnea, and/or excessive daytime sleepiness were discussed with the patient. · Educational materials provided. 8. Continue with current oral guard and follow up with dentist.  · Treatment options including CPAP, dental appliance, weight reduction measures, positional therapy, surgeries etc were discussed. - If the patient has mild to moderate JANINA he would like to explore a dental appliance before considering CPAP therapy. · Healthy lifestyle changes to include weight loss and exercise discussed. · Healthy sleep habits were reviewed and encouraged. ·  and workplace safety reviewed and discussed as appropriate. Drowsy and/or inattentive driving should be avoided. · Follow-up in after sleep study, sooner should new symptoms or problems arise. History of Present Illness: Mr. Alisa Espinoza is a 41 y.o. male patient who was referred by his PCP for possible sleep breathing disorder.   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 Motor vehicle accident(s) associated with drowsy driving:is denied by the patient     Snoring: This is a Chronic problem which has been ongoing for years. He has woke himself up snoring before but he thinks it is episodic and occur when he is really tired. Fatigue:   He states he does not feel tired but he does take daily naps. Dental: Teeth clenching or grindingis reported. He wears a nightguard. Naps: are reported. He takes 15-20 minute nap when he gets home form work. Leg Symptoms/Pain: He does not have unpleasant or crawling sensation in legs or strong urge to move when inactive. GERD: is not reported. Mood: Denies anxiety and/or depression    Sleep-Wake History:     Estimates sleeping approximately 7 hours per night/day. He gets into bed at approximately 2200. Once in bed,he typically goes to sleep but he will look at social media on occasion. It usually takes up to 30 minutes to fall asleep after going to bed. He get up to use the bathroom 0 times nightly. Occasional dream recall. He normally awakens to start their day at 0500. He typically gets out of bed at that time. denies waking up with a morning headache. denies awakening with a dry mouth. Denies symptoms suggestive of cataplexy. Denies sleep paralysis. Denies hypnagogic and/or hypnopompic hallucinations. Denies sleep talking/ walking. Denies other unusual and/or parasomnia behaviors. Family Sleep History:  Father: JANINA - uses CPAP    Stop Aman Penta 3/18/2019   Does the patient snore loudly (louder than talking or loud enough to be heard through closed doors)? 0   Does the patient often feel tired, fatigued, or sleepy during the daytime, even after a \"good\" night's sleep? 1   Has anyone ever observed the patient stop breathing during their sleep?  0   Does the patient have or are they being treated for high blood pressure? 0   Is the patient's BMI greater than 35? 0   Is your neck circumference greater than 17 inches (Male) or 16 inches (Female)? 1   Is the patient older than 48? 0   Is the patient male? 1   JANINA Score 3       3 most recent PHQ Screens 3/18/2019   Little interest or pleasure in doing things Not at all   Feeling down, depressed, irritable, or hopeless Not at all   Total Score PHQ 2 0       [place] Scale 3/18/2019   Sitting and Reading 1   Watching TV 1   Sitting, inactive in a public place (e.g. a movie theater or meeting) 0   As a passenger in a car for an hour, without a break 2   Lying down to rest in the afternoon, when circumstances permit 3   Sitting and talking to someone 0   Sitting quietly after lunch without alcohol 2   In a car, while stopped for a few minutes in traffic 0   [place] Sleepiness Score 9        Neck circ. in \"inches\": 17.5     Past Medical History:  Past Medical History:   Diagnosis Date    Elevated LDL cholesterol level     Tinea corporis        Past Surgical History:  History reviewed. No pertinent surgical history. Family History:  Family History   Problem Relation Age of Onset    No Known Problems Mother     High Cholesterol Father        Social History:    Caffeine Amount Time of last Intake Comments   Coffee 2 cups/am     Soda None     Tea None     Energy Drinks None     Over- the - counter stimulant pills None     Other Substances      Alcohol 8/ weekend  No drinking during the week   Tobacco None     Drugs None         Medications:  Current Outpatient Medications on File Prior to Visit   Medication Sig Dispense Refill    simvastatin (ZOCOR) 10 mg tablet Take 1 Tab by mouth nightly. 30 Tab 3    cetirizine (ZYRTEC) 5 mg tablet Take 5 mg by mouth daily. No current facility-administered medications on file prior to visit.          Allergy:  No Known Allergies    Review of Systems  General ROS: positive for  - sleep disturbance  negative for - chills, fatigue, fever, hot flashes, malaise, night sweats, weight gain or weight loss  ENT ROS: negative for - headaches, hearing change, nasal congestion, nasal discharge, nasal polyps, oral lesions, sinus pain, sneezing, sore throat, tinnitus, vertigo, visual changes or vocal changes, + for recurrent nose bleeds - worse in the winter time. + seasonal allergies. Hematological and Lymphatic ROS: negative for - bleeding problems, blood clots, bruising, jaundice, pallor or swollen lymph nodes  Endocrine ROS: negative for - polydipsia/polyuria, skin changes, temperature intolerance or unexpected weight changes  Respiratory ROS: no cough, shortness of breath, or wheezing  Cardiovascular ROS: no chest pain or dyspnea on exertion  Gastrointestinal ROS: no abdominal pain, change in bowel habits, or black or bloody stools  Genito-Urinary ROS: no dysuria, trouble voiding, or hematuria  Musculoskeletal ROS: negative  Neurological ROS: no TIA or stroke symptoms  Dermatological ROS: negative for - pruritus, rash or skin lesion changes   Psychological ROS: negative   Otherwise negative. Physical Exam:  Blood pressure 130/84, pulse (!) 48, temperature 98 °F (36.7 °C), temperature source Oral, resp. rate 16, height 5' 8\" (1.727 m), weight 100.2 kg (221 lb), SpO2 98 %. on RA , Body mass index is 33.6 kg/m². General: in no respiratory distress, acyanotic, appears stated age, cooperative, pleasant  HEENT: Head size proportionally small, PERRL, EOMI, throat without erythema or exudate, Tongue with  dental indention on tongue, Mallampati's score 3+, Uvula- midline, + mild retrognathia, Tonsils- 1  Neck: Supple,  no abnormally enlarged lymph nodes, thyroid is not enlarged, non-tender, No carotid bruit.  No JVD  Chest: normal  Lungs: moderate air entry, clear to auscultation bilaterally,   Heart: Regular rate and rhythm, S1S2 present, without murmur  Abdomen: Protuberant, bowel sounds normoactive, abdomen is soft without significant tenderness, or guarding  Extremity: negative for edema, cyanosis or clubbing  Skin: Skin color, texture, turgor normal. No rashes or lesions, Short nails- patient picks at them  Neurological: CN 2-12 grossly intact, normal muscle tone    Data Reviewed:  CBC:   Lab Results   Component Value Date/Time    WBC 6.0 12/27/2018 09:21 AM    HGB 15.7 12/27/2018 09:21 AM    HCT 45.7 12/27/2018 09:21 AM    PLATELET 192 91/97/3388 09:21 AM    MCV 89.8 12/27/2018 09:21 AM       BMP:   Lab Results   Component Value Date/Time    Sodium 139 12/27/2018 09:21 AM    Potassium 4.6 12/27/2018 09:21 AM    Chloride 108 12/27/2018 09:21 AM    CO2 27 12/27/2018 09:21 AM    Anion gap 4 12/27/2018 09:21 AM    Glucose 96 12/27/2018 09:21 AM    BUN 18 12/27/2018 09:21 AM    Creatinine 1.04 12/27/2018 09:21 AM    BUN/Creatinine ratio 17 12/27/2018 09:21 AM    GFR est AA >60 12/27/2018 09:21 AM    GFR est non-AA >60 12/27/2018 09:21 AM    Calcium 9.2 12/27/2018 09:21 AM        TSH:  Lab Results   Component Value Date/Time    TSH 1.50 12/27/2018 09:21 AM       Imaging:  [x]I have personally reviewed the patients radiographs section   Results from Hospital Encounter encounter on 07/01/11   XR HAND MIN 3 VIEWS RIGHT    Narrative Ordering MD: Syed Macias MD  Signed By: Jessica Manzanares MD  ** FINAL **  ---------------------------------------------------------------------  PROCEDURE DATE:  Jul 1 2011  9:09AM    Accession Number:  [id number]  Order No:   [id number]  Procedure:   RDX - HAND LT  MIN 3 VIEWS  CPT Code:   62171  Reason:   TRAUMA  INTERPRETATION:  THREE VIEW LEFT HAND:   No fracture or other acute abnormalities are demonstrated in the   hand. No radiopaque foreign bodies. IMPRESSION:   NORMAL LEFT HAND. No results found for this or any previous visit. Cardiac Echo: No Testing To Date. Historical Sleep Testing Data: No Testing To Date.           Jovani Singleton DO, [facility]  Pulmonary, Sleep and Critical Care Medicine

## 2019-03-18 NOTE — PROGRESS NOTES
Chief Complaint   Patient presents with   Ted Brewster Referral / Consult     referred by Dr. Nathan Mendoza for sleep evaluation     1. Have you been to the ER, urgent care clinic since your last visit? Hospitalized since your last visit? No    2. Have you seen or consulted any other health care providers outside of the [address] [place] [address] since your last visit? Include any pap smears or colon screening.  No

## 2019-04-08 ENCOUNTER — HOSPITAL ENCOUNTER (OUTPATIENT)
Dept: SLEEP MEDICINE | Age: 39
Discharge: HOME | End: 2019-04-08
Payer: COMMERCIAL

## 2019-04-08 DIAGNOSIS — E78.5 DYSLIPIDEMIA: ICD-10-CM

## 2019-04-08 DIAGNOSIS — F45.8 BRUXISM: ICD-10-CM

## 2019-04-08 DIAGNOSIS — R06.83 SNORING: ICD-10-CM

## 2019-04-08 DIAGNOSIS — E66.9 OBESITY (BMI 30-39.9): ICD-10-CM

## 2019-04-08 PROCEDURE — 95806 SLEEP STUDY UNATT&RESP EFFT: CPT

## 2019-04-08 NOTE — PROGRESS NOTES
Patient Instructions  Should you need assistance after arriving home before 4:00 p.m. on a week day:  please call the sleep center at [phone]. When you are ready for bed and to begin the study, please follow these instructions:    1. Apply the device and components as demonstrated on the instruction sheet provided. 2. Apply respiratory belt and device around chest, located at nipple line and tighten to a snug fit. 3. Attach the nasal cannula filter to the port on the side of device and then place prongs in the nostrils and around ears, tightening the slider for a more secure fit. Be sure to use a medical tape to secure the plastic tubing to the patients cheeks. 4. Place the pulse Ox probe on the pointer figure. 5. Press the on button located on the device to initiate recording. Note 3 corners (refer to patient instructions) of the device should have a green light which will illuminate if sensors are functioning properly. 6. On the Home Study Activity Log (located below), document the date and time you are starting your study. Activity should be In Bed. 7. Remember 8-9 hours of recording (bed-time) with at least 6 hours of sleep is necessary in effort to capture as much valid data as possible is needed. To end the Home Sleep Test:  1. When you wake up for the day, record on the study log date/time (a.m. /p.m.) that you woke up. 2. Gently remove all sensors  3.  Return it to the case provided  Return the unit in its entirety to the sleep center as discussed with the technologist.

## 2019-04-09 ENCOUNTER — HOSPITAL ENCOUNTER (OUTPATIENT)
Dept: SLEEP MEDICINE | Age: 39
Discharge: HOME | End: 2019-04-09
Payer: COMMERCIAL

## 2019-04-10 DIAGNOSIS — G47.33 OSA (OBSTRUCTIVE SLEEP APNEA): Primary | ICD-10-CM

## 2019-04-10 NOTE — PROGRESS NOTES
Patient underwent home sleep study. Recommendations listed below. Please refer to full report for specific details.  Moderate Obstructive Sleep Apnea (JANINA)   The Oxygen Desaturation Index (ASHTYN) was 17.5 and the SpO2 rikki for this study was 80%. Intermittent tachycardia and bradycardia were also noted.  There were some episodes of desaturation that could not be scored. The severity of JANINA may be underestimated.  Start APAP 5/15/cwp.  Other non-invasive treatment options are recommended were applicable and include the following: weight reduction, smoking cessation, body position training, and modification of alcohol ingestion and/or sedating agents.  Healthy sleep habit education and reinforcement will be reviewed with the patient.  Individuals are encouraged to obtain 7-9 hours of sleep per day.   safety is encouraged. Drowsy and/or inattentive driving should be avoided.  Follow up with referring provider.     Humza Magaña DO, FCCP    [facility]  Pulmonary, Critical Care, and Sleep Medicine

## 2019-04-23 ENCOUNTER — TELEPHONE (OUTPATIENT)
Dept: PULMONOLOGY | Age: 39
End: 2019-04-23

## 2019-04-23 NOTE — TELEPHONE ENCOUNTER
Pt stated he would like the results of his at home sleep study from 4/8/19. Please advise [phone].

## 2019-04-23 NOTE — TELEPHONE ENCOUNTER
Patient calling for HST results. Message forwarded to Heaven Christopher NP.     Heaven Christopher Np called and reviewed sleep study results with patient.

## 2019-04-24 ENCOUNTER — TELEPHONE (OUTPATIENT)
Dept: PULMONOLOGY | Age: 39
End: 2019-04-24

## 2019-04-24 NOTE — TELEPHONE ENCOUNTER
Contacted patient and reviewed sleep study results. He verbalized understanding, will start CPAP and follow up in 4-8 weeks, no further questions at this time.

## 2019-06-10 ENCOUNTER — TELEPHONE (OUTPATIENT)
Dept: PULMONOLOGY | Age: 39
End: 2019-06-10

## 2019-06-10 DIAGNOSIS — G47.33 OSA (OBSTRUCTIVE SLEEP APNEA): Primary | ICD-10-CM

## 2019-06-25 NOTE — TELEPHONE ENCOUNTER
Pt called, stating he only wants the portable cpap, not both units. He states he is renting the cpap and that his insurance has told him that they will pay for the portable. Is it possible to for him to only have the portable only? Please call pt and let him know at [phone].

## 2019-07-08 NOTE — TELEPHONE ENCOUNTER
Spoke with patient, will place order for Portable CPAP machine with humidification, for frequent travels. Order faxed to MED. Order placed for Portable CPAP, per Verbal Order from Dr. Madison Hancock on 7/8/2019. Last office visit: 3/18/19  Follow up Visit: 8/09/19    Provider is aware of last office visit and follow up. No further action requested from provider.

## 2019-08-09 ENCOUNTER — OFFICE VISIT (OUTPATIENT)
Dept: PULMONOLOGY | Age: 39
End: 2019-08-09

## 2019-08-09 VITALS
WEIGHT: 218 LBS | DIASTOLIC BLOOD PRESSURE: 98 MMHG | OXYGEN SATURATION: 96 % | HEART RATE: 69 BPM | RESPIRATION RATE: 18 BRPM | SYSTOLIC BLOOD PRESSURE: 140 MMHG | TEMPERATURE: 98.2 F | BODY MASS INDEX: 33.04 KG/M2 | HEIGHT: 68 IN

## 2019-08-09 DIAGNOSIS — E66.9 OBESITY (BMI 30-39.9): ICD-10-CM

## 2019-08-09 DIAGNOSIS — G47.33 OSA (OBSTRUCTIVE SLEEP APNEA): Primary | ICD-10-CM

## 2019-08-09 DIAGNOSIS — F45.8 BRUXISM: ICD-10-CM

## 2019-08-09 DIAGNOSIS — R03.0 ELEVATED BLOOD PRESSURE READING: ICD-10-CM

## 2019-08-09 NOTE — PROGRESS NOTES
[facility]  Pulmonary, Critical Care, and Sleep Medicine    Office Progress Note-Follow Up      Primary Care Physician: Stacia Desouza MD     Reason for Visit:  Evaluation for for possible sleep disorder. Assessment:  1. Obstructive Sleep Apnea (JANINA)- Moderate (AHI: 16.9)- Currently receiving clinical benefit from therapy and have very good compliance. He is using both a stationary device and travel device. I was not able to assess compliance data for travel device today. AHI a little high but he is doing well with a 90% pressure of 9.5. Will hold on current settings for now. 2. Hyperlipidemia  3. Elevated BP- BP elevated today; asymptomatic- needs follow up   4. Bruxism- Has oral guard- working well for patient  5. Obesity: Body mass index is 33.15 kg/m². 6. Seasonal Allergies- Zyrtec, Has had nosebleeds with nasal steroid  7. Alcohol use: on weekends  8. Report of recurrent nosebleeds- currently well controlled. Plan:    · Continue APAP for now  · Continue with current oral guard and follow up with dentist.  · Contact DME about travel device compliance data  · Healthy lifestyle changes to include weight loss and exercise discussed. · Healthy sleep habits were reviewed and encouraged. ·  and workplace safety reviewed and discussed as appropriate. Drowsy and/or inattentive driving should be avoided. · Follow-up in 12 months, sooner should new symptoms or problems arise. · Follow up with Primary Care Provider (PCP) as directed and for  BP, routine health care maintenance. History of Present Illness: Mr. Demond Reyes is a 46 y.o. male patient who presents today for routine follow up. 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       Motor vehicle accident(s) associated with drowsy driving:is denied by the patient     Since his last visit, he underwent home sleep testing and was noted to have moderate JANINA. He was subsequently started on APAP therapy. He reports the following today:    · Do to his work and frequent travel he bought a travel Respironics APAP device in addition to home unit  · He is very happy with therapy. · Sleep is better consolidated and he has more daytime energy  · He recently returned from [facility].  · I am unable to assess compliance on his travel device. · No skin breakdown  · No aerophagia or bloating  · He did have a little rain out on his travel device    Positive Airway Pressure (PAP) Compliance  Report & Summary Trends  DME: MED    Date >4 hr use % Time  (Total Time%) AHI PAP Rx Pressure @ 90% Average Use Time Leak-large Notes   7/10-8/8/19 60 (63.3) 6.8 APAP 5/15 9.5 06:00:07 00:01:38 Has travel unit                                                 Initial Sleep History Intake:  Snoring: This is a Chronic problem which has been ongoing for years. He has woke himself up snoring before but he thinks it is episodic and occur when he is really tired. Fatigue:   He states he does not feel tired but he does take daily naps. Dental: Teeth clenching or grindingis reported. He wears a nightguard. Naps: are reported. He takes 15-20 minute nap when he gets home form work. Leg Symptoms/Pain: He does not have unpleasant or crawling sensation in legs or strong urge to move when inactive. GERD: is not reported. Mood: Denies anxiety and/or depression    Sleep-Wake History:     Estimates sleeping approximately 7 hours per night/day. He gets into bed at approximately 2200. Once in bed,he typically goes to sleep but he will look at social media on occasion. It usually takes up to 30 minutes to fall asleep after going to bed. He get up to use the bathroom 0 times nightly. Occasional dream recall. He normally awakens to start their day at 0500.  He typically gets out of bed at that time.    denies waking up with a morning headache. denies awakening with a dry mouth. Denies symptoms suggestive of cataplexy. Denies sleep paralysis. Denies hypnagogic and/or hypnopompic hallucinations. Denies sleep talking/ walking. Denies other unusual and/or parasomnia behaviors. Family Sleep History:  Father: JANINA - uses CPAP    Stop Teressa Causey 3/18/2019   Does the patient snore loudly (louder than talking or loud enough to be heard through closed doors)? 0   Does the patient often feel tired, fatigued, or sleepy during the daytime, even after a \"good\" night's sleep? 1   Has anyone ever observed the patient stop breathing during their sleep?  0   Does the patient have or are they being treated for high blood pressure? 0   Is the patient's BMI greater than 35? 0   Is your neck circumference greater than 17 inches (Male) or 16 inches (Female)? 1   Is the patient older than 48? 0   Is the patient male? 1   JANINA Score 3       3 most recent PHQ Screens 8/9/2019   Little interest or pleasure in doing things Not at all   Feeling down, depressed, irritable, or hopeless Not at all   Total Score PHQ 2 0       [place] Scale 8/9/2019 3/18/2019   Sitting and Reading 0 1   Watching TV 1 1   Sitting, inactive in a public place (e.g. a movie theater or meeting) 0 0   As a passenger in a car for an hour, without a break 3 2   Lying down to rest in the afternoon, when circumstances permit 3 3   Sitting and talking to someone 0 0   Sitting quietly after lunch without alcohol 1 2   In a car, while stopped for a few minutes in traffic 0 0   [place] Sleepiness Score 8 9              Past Medical History:  Past Medical History:   Diagnosis Date    Elevated LDL cholesterol level     Tinea corporis        Past Surgical History:  History reviewed. No pertinent surgical history.     Family History:  Family History   Problem Relation Age of Onset    No Known Problems Mother     High Cholesterol Father        Social History:    Caffeine Amount Time of last Intake Comments   Coffee 2 cups/am     Soda None     Tea None     Energy Drinks None     Over- the - counter stimulant pills None     Other Substances      Alcohol 8/ weekend  No drinking during the week   Tobacco None     Drugs None         Medications:  Current Outpatient Medications on File Prior to Visit   Medication Sig Dispense Refill    cetirizine (ZYRTEC) 5 mg tablet Take 5 mg by mouth daily.  simvastatin (ZOCOR) 10 mg tablet TAKE 1 TABLET BY MOUTH EVERY DAY AT NIGHT 90 Tab 3     No current facility-administered medications on file prior to visit. Allergy:  No Known Allergies    Review of Systems  General ROS: positive for  - sleep disturbance  negative for - chills, fatigue, fever, hot flashes, malaise, night sweats, weight gain or weight loss  ENT ROS: negative for - headaches, hearing change, nasal congestion, nasal discharge, nasal polyps, oral lesions, sinus pain, sneezing, sore throat, tinnitus, vertigo, visual changes or vocal changes, + for recurrent nose bleeds - worse in the winter time and with nasal steroid. + seasonal allergies. - not an issue at this time  Hematological and Lymphatic ROS: negative for - bleeding problems, blood clots, bruising, jaundice, pallor or swollen lymph nodes  Endocrine ROS: negative for - polydipsia/polyuria, skin changes, temperature intolerance or unexpected weight changes  Respiratory ROS: no cough, shortness of breath, or wheezing  Cardiovascular ROS: no chest pain or dyspnea on exertion  Gastrointestinal ROS: no abdominal pain, change in bowel habits, or black or bloody stools  Genito-Urinary ROS: no dysuria, trouble voiding, or hematuria  Musculoskeletal ROS: negative  Neurological ROS: no TIA or stroke symptoms  Dermatological ROS: negative for - pruritus, rash or skin lesion changes   Psychological ROS: negative   Otherwise negative.       Physical Exam:  Blood pressure (!) 140/98, pulse 69, temperature 98.2 °F (36.8 °C), temperature source Oral, resp. rate 18, height 5' 8\" (1.727 m), weight 98.9 kg (218 lb), SpO2 96 %. on RA , Body mass index is 33.15 kg/m². General: in no respiratory distress, acyanotic, appears stated age, cooperative, pleasant  HEENT: Head size proportionally small, PERRL, EOMI, throat without erythema or exudate, Tongue with  dental indention on tongue, Mallampati's score 3+, Uvula- midline, + mild retrognathia, Tonsils- 1  Neck: Supple,  no abnormally enlarged lymph nodes, thyroid is not enlarged, non-tender, No carotid bruit.  No JVD  Chest: normal  Lungs: moderate air entry, clear to auscultation bilaterally,   Heart: Regular rate and rhythm, S1S2 present, without murmur  Abdomen: Protuberant, bowel sounds normoactive, abdomen is soft without significant tenderness, or guarding  Extremity: negative for edema, cyanosis or clubbing  Skin: Skin color, texture, turgor normal. No rashes or lesions, Short nails- patient picks at them  Neurological: CN 2-12 grossly intact, normal muscle tone    Data Reviewed:  CBC:   Lab Results   Component Value Date/Time    WBC 6.0 12/27/2018 09:21 AM    HGB 15.7 12/27/2018 09:21 AM    HCT 45.7 12/27/2018 09:21 AM    PLATELET 572 76/63/0751 09:21 AM    MCV 89.8 12/27/2018 09:21 AM       BMP:   Lab Results   Component Value Date/Time    Sodium 139 12/27/2018 09:21 AM    Potassium 4.6 12/27/2018 09:21 AM    Chloride 108 12/27/2018 09:21 AM    CO2 27 12/27/2018 09:21 AM    Anion gap 4 12/27/2018 09:21 AM    Glucose 96 12/27/2018 09:21 AM    BUN 18 12/27/2018 09:21 AM    Creatinine 1.04 12/27/2018 09:21 AM    BUN/Creatinine ratio 17 12/27/2018 09:21 AM    GFR est AA >60 12/27/2018 09:21 AM    GFR est non-AA >60 12/27/2018 09:21 AM    Calcium 9.2 12/27/2018 09:21 AM        TSH:  Lab Results   Component Value Date/Time    TSH 1.50 12/27/2018 09:21 AM       Imaging:  [x]I have personally reviewed the patients radiographs section   Results from ANNE-MARIE Arita encounter on 07/01/11   XR HAND MIN 3 VIEWS RIGHT    Narrative Ordering MD: Onel Whatley MD  Signed By: Bernard Nobles MD  ** FINAL **  ---------------------------------------------------------------------  PROCEDURE DATE:  Jul 1 2011  9:09AM    Accession Number:  [id number]  Order No:   [id number]  Procedure:   RDX - HAND LT  MIN 3 VIEWS  CPT Code:   61788  Reason:   TRAUMA  INTERPRETATION:  THREE VIEW LEFT HAND:   No fracture or other acute abnormalities are demonstrated in the   hand. No radiopaque foreign bodies. IMPRESSION:   NORMAL LEFT HAND. No results found for this or any previous visit. Cardiac Echo: No Testing To Date.        Historical Sleep Testing Data:    - HST: 4/9/19: AHI: 16.9, ASHTYN: 17.5, SpO2 rikki: 80%        Ernestina Lerma DO, [facility]  Pulmonary, Sleep and Critical Care Medicine

## 2019-08-09 NOTE — LETTER
8/9/19 Patient: Neeta Brown YOB: 1980 Date of Visit: 8/9/2019 Terri Luevano MD 
[facility] [address] [address] [id number] VIA In Basket Dear Terri Luevano MD, Thank you for referring Mr. Neeta Brown to [address] [place] [address] for evaluation. My notes for this consultation are attached. If you have questions, please do not hesitate to call me. I look forward to following your patient along with you.  
 
 
Sincerely, 
 
Den Rouse, DO

## 2019-08-09 NOTE — PROGRESS NOTES
Gilmar Light presents today for   Chief Complaint   Patient presents with    Sleep Apnea     Study done 4/9    Snoring       Is someone accompanying this pt? No     Is the patient using any DME equipment during OV? Cpap    -[employer] MED    Depression Screening:  3 most recent PHQ Screens 8/9/2019   Little interest or pleasure in doing things Not at all   Feeling down, depressed, irritable, or hopeless Not at all   Total Score PHQ 2 0       Learning Assessment:  Learning Assessment 8/15/2016   PRIMARY LEARNER Patient   HIGHEST LEVEL OF EDUCATION - PRIMARY LEARNER  4 YEARS [facility] PRIMARY LEARNER NONE   CO-LEARNER CAREGIVER No   PRIMARY LANGUAGE ENGLISH    NEED No   LEARNER PREFERENCE PRIMARY DEMONSTRATION   LEARNING SPECIAL TOPICS no   ANSWERED BY sander   RELATIONSHIP SELF   ASSESSMENT COMMENT none       Abuse Screening:  Abuse Screening Questionnaire 8/9/2019   Do you ever feel afraid of your partner? N   Are you in a relationship with someone who physically or mentally threatens you? N   Is it safe for you to go home? Y       Fall Risk  No flowsheet data found. Coordination of Care:  1. Have you been to the ER, urgent care clinic since your last visit? Hospitalized since your last visit? No    2. Have you seen or consulted any other health care providers outside of the [address] [place] [address] since your last visit? Include any pap smears or colon screening.  No     Medication variance in dosage/sig per patient as follows: no change

## 2020-01-29 ENCOUNTER — OFFICE VISIT (OUTPATIENT)
Dept: INTERNAL MEDICINE CLINIC | Age: 40
End: 2020-01-29

## 2020-01-29 ENCOUNTER — HOSPITAL ENCOUNTER (OUTPATIENT)
Dept: LAB | Age: 40
Discharge: HOME | End: 2020-01-29
Payer: COMMERCIAL

## 2020-01-29 VITALS
WEIGHT: 225 LBS | TEMPERATURE: 98.7 F | SYSTOLIC BLOOD PRESSURE: 121 MMHG | HEART RATE: 49 BPM | BODY MASS INDEX: 34.1 KG/M2 | OXYGEN SATURATION: 97 % | DIASTOLIC BLOOD PRESSURE: 72 MMHG | RESPIRATION RATE: 18 BRPM | HEIGHT: 68 IN

## 2020-01-29 DIAGNOSIS — Z00.00 ROUTINE GENERAL MEDICAL EXAMINATION AT A HEALTH CARE FACILITY: Primary | ICD-10-CM

## 2020-01-29 DIAGNOSIS — E78.2 MIXED HYPERLIPIDEMIA: ICD-10-CM

## 2020-01-29 DIAGNOSIS — Z23 ENCOUNTER FOR IMMUNIZATION: ICD-10-CM

## 2020-01-29 DIAGNOSIS — Z00.00 ROUTINE GENERAL MEDICAL EXAMINATION AT A HEALTH CARE FACILITY: ICD-10-CM

## 2020-01-29 LAB
ALBUMIN SERPL-MCNC: 4.1 G/DL (ref 3.4–5)
ALBUMIN/GLOB SERPL: 1.3 {RATIO} (ref 0.8–1.7)
ALP SERPL-CCNC: 54 U/L (ref 45–117)
ALT SERPL-CCNC: 56 U/L (ref 16–61)
ANION GAP SERPL CALC-SCNC: 5 MMOL/L (ref 3–18)
APPEARANCE UR: CLEAR
AST SERPL-CCNC: 29 U/L (ref 10–38)
BILIRUB SERPL-MCNC: 0.6 MG/DL (ref 0.2–1)
BILIRUB UR QL: NEGATIVE
BUN SERPL-MCNC: 15 MG/DL (ref 7–18)
BUN/CREAT SERPL: 15 (ref 12–20)
CALCIUM SERPL-MCNC: 9.2 MG/DL (ref 8.5–10.1)
CHLORIDE SERPL-SCNC: 108 MMOL/L (ref 100–111)
CHOLEST SERPL-MCNC: 242 MG/DL
CO2 SERPL-SCNC: 27 MMOL/L (ref 21–32)
COLOR UR: NORMAL
CREAT SERPL-MCNC: 1 MG/DL (ref 0.6–1.3)
ERYTHROCYTE [DISTWIDTH] IN BLOOD BY AUTOMATED COUNT: 12.6 % (ref 11.6–14.5)
GLOBULIN SER CALC-MCNC: 3.2 G/DL (ref 2–4)
GLUCOSE SERPL-MCNC: 87 MG/DL (ref 74–99)
GLUCOSE UR STRIP.AUTO-MCNC: NEGATIVE MG/DL
HBA1C MFR BLD: 5.3 % (ref 4.2–5.6)
HCT VFR BLD AUTO: 44.5 % (ref 36–48)
HDLC SERPL-MCNC: 56 MG/DL (ref 40–60)
HDLC SERPL: 4.3 {RATIO} (ref 0–5)
HGB BLD-MCNC: 15.6 G/DL (ref 13–16)
HGB UR QL STRIP: NEGATIVE
KETONES UR QL STRIP.AUTO: NEGATIVE MG/DL
LDLC SERPL CALC-MCNC: 167.4 MG/DL (ref 0–100)
LEUKOCYTE ESTERASE UR QL STRIP.AUTO: NEGATIVE
LIPID PROFILE,FLP: ABNORMAL
MCH RBC QN AUTO: 31.3 PG (ref 24–34)
MCHC RBC AUTO-ENTMCNC: 35.1 G/DL (ref 31–37)
MCV RBC AUTO: 89.2 FL (ref 74–97)
NITRITE UR QL STRIP.AUTO: NEGATIVE
PH UR STRIP: 5.5 [PH] (ref 5–8)
PLATELET # BLD AUTO: 223 K/UL (ref 135–420)
PMV BLD AUTO: 10.6 FL (ref 9.2–11.8)
POTASSIUM SERPL-SCNC: 4.7 MMOL/L (ref 3.5–5.5)
PROT SERPL-MCNC: 7.3 G/DL (ref 6.4–8.2)
PROT UR STRIP-MCNC: NEGATIVE MG/DL
RBC # BLD AUTO: 4.99 M/UL (ref 4.7–5.5)
SODIUM SERPL-SCNC: 140 MMOL/L (ref 136–145)
SP GR UR REFRACTOMETRY: 1.02 (ref 1–1.03)
TRIGL SERPL-MCNC: 93 MG/DL (ref ?–150)
TSH SERPL DL<=0.05 MIU/L-ACNC: 1.13 UIU/ML (ref 0.36–3.74)
UROBILINOGEN UR QL STRIP.AUTO: 0.2 EU/DL (ref 0.2–1)
VLDLC SERPL CALC-MCNC: 18.6 MG/DL
WBC # BLD AUTO: 5.2 K/UL (ref 4.6–13.2)

## 2020-01-29 PROCEDURE — 80053 COMPREHEN METABOLIC PANEL: CPT

## 2020-01-29 PROCEDURE — 84443 ASSAY THYROID STIM HORMONE: CPT

## 2020-01-29 PROCEDURE — 80061 LIPID PANEL: CPT

## 2020-01-29 PROCEDURE — 85027 COMPLETE CBC AUTOMATED: CPT

## 2020-01-29 PROCEDURE — 83036 HEMOGLOBIN GLYCOSYLATED A1C: CPT

## 2020-01-29 PROCEDURE — 81003 URINALYSIS AUTO W/O SCOPE: CPT

## 2020-01-29 PROCEDURE — 36415 COLL VENOUS BLD VENIPUNCTURE: CPT

## 2020-01-29 NOTE — PROGRESS NOTES
HISTORY OF PRESENT ILLNESS  Colton Pride is a 46 y.o. male. HPI  Presents for a CPE with fasting lab. No c/o. He took the Simvastatin without complaint but did not realize he needed to continue this. He is agreeable to resumption if necessary. He is exercising (weights with some cardio) and reports an improved diet since his physical last year. He is compliant with his CPAP. Admits to fam hx of high cholesterol. Denies fam hx of CAD, bypass surgery, MI. Past Medical History:   Diagnosis Date    Elevated LDL cholesterol level     Sleep apnea, obstructive     with CPAP    Tinea corporis      Current Outpatient Medications   Medication Sig Dispense Refill    cetirizine (ZYRTEC) 5 mg tablet Take 5 mg by mouth daily.  simvastatin (ZOCOR) 10 mg tablet TAKE 1 TABLET BY MOUTH EVERY DAY AT NIGHT 90 Tab 3         Hospital Outpatient Visit on 12/27/2018   Component Date Value Ref Range Status    WBC 12/27/2018 6.0  4.6 - 13.2 K/uL Final    RBC 12/27/2018 5.09  4.70 - 5.50 M/uL Final    HGB 12/27/2018 15.7  13.0 - 16.0 g/dL Final    HCT 12/27/2018 45.7  36.0 - 48.0 % Final    MCV 12/27/2018 89.8  74.0 - 97.0 FL Final    MCH 12/27/2018 30.8  24.0 - 34.0 PG Final    MCHC 12/27/2018 34.4  31.0 - 37.0 g/dL Final    RDW 12/27/2018 12.3  11.6 - 14.5 % Final    PLATELET 58/95/8595 396  135 - 420 K/uL Final    MPV 12/27/2018 10.6  9.2 - 11.8 FL Final    NEUTROPHILS 12/27/2018 72  40 - 73 % Final    LYMPHOCYTES 12/27/2018 22  21 - 52 % Final    MONOCYTES 12/27/2018 5  3 - 10 % Final    EOSINOPHILS 12/27/2018 1  0 - 5 % Final    BASOPHILS 12/27/2018 0  0 - 2 % Final    ABS. NEUTROPHILS 12/27/2018 4.3  1.8 - 8.0 K/UL Final    ABS. LYMPHOCYTES 12/27/2018 1.3  0.9 - 3.6 K/UL Final    ABS. MONOCYTES 12/27/2018 0.3  0.05 - 1.2 K/UL Final    ABS. EOSINOPHILS 12/27/2018 0.1  0.0 - 0.4 K/UL Final    ABS.  BASOPHILS 12/27/2018 0.0  0.0 - 0.1 K/UL Final    DF 12/27/2018 AUTOMATED    Final    Sodium 12/27/2018 139  136 - 145 mmol/L Final    Potassium 12/27/2018 4.6  3.5 - 5.5 mmol/L Final    Chloride 12/27/2018 108  100 - 108 mmol/L Final    CO2 12/27/2018 27  21 - 32 mmol/L Final    Anion gap 12/27/2018 4  3.0 - 18 mmol/L Final    Glucose 12/27/2018 96  74 - 99 mg/dL Final    BUN 12/27/2018 18  7.0 - 18 MG/DL Final    Creatinine 12/27/2018 1.04  0.6 - 1.3 MG/DL Final    BUN/Creatinine ratio 12/27/2018 17  12 - 20   Final    GFR est AA 12/27/2018 >60  >60 ml/min/1.73m2 Final    GFR est non-AA 12/27/2018 >60  >60 ml/min/1.73m2 Final    Comment: (NOTE)  Estimated GFR is calculated using the Modification of Diet in Renal   Disease (MDRD) Study equation, reported for both  Americans   (GFRAA) and non- Americans (GFRNA), and normalized to 1.73m2   body surface area. The physician must decide which value applies to   the patient. The MDRD study equation should only be used in   individuals age 25 or older. It has not been validated for the   following: pregnant women, patients with serious comorbid conditions,   or on certain medications, or persons with extremes of body size,   muscle mass, or nutritional status.  Calcium 12/27/2018 9.2  8.5 - 10.1 MG/DL Final    Bilirubin, total 12/27/2018 0.5  0.2 - 1.0 MG/DL Final    ALT (SGPT) 12/27/2018 75* 16 - 61 U/L Final    AST (SGOT) 12/27/2018 37  15 - 37 U/L Final    Alk. phosphatase 12/27/2018 49  45 - 117 U/L Final    Protein, total 12/27/2018 7.2  6.4 - 8.2 g/dL Final    Albumin 12/27/2018 4.0  3.4 - 5.0 g/dL Final    Globulin 12/27/2018 3.2  2.0 - 4.0 g/dL Final    A-G Ratio 12/27/2018 1.3  0.8 - 1.7   Final    LIPID PROFILE 12/27/2018        Final    Cholesterol, total 12/27/2018 269* <200 MG/DL Final    Triglyceride 12/27/2018 181* <150 MG/DL Final    Comment: The drugs N-acetylcysteine (NAC) and  Metamiszole have been found to cause falsely  low results in this chemical assay.  Please  be sure to submit blood samples obtained  BEFORE administration of either of these  drugs to assure correct results.  HDL Cholesterol 12/27/2018 53  40 - 60 MG/DL Final    LDL, calculated 12/27/2018 179.8* 0 - 100 MG/DL Final    VLDL, calculated 12/27/2018 36.2  MG/DL Final    CHOL/HDL Ratio 12/27/2018 5.1* 0 - 5.0   Final    TSH 12/27/2018 1.50  0.36 - 3.74 uIU/mL Final    Hemoglobin A1c 12/27/2018 5.6  4.2 - 5.6 % Final    Comment: (NOTE)  HbA1C Interpretive Ranges  <5.7              Normal  5.7 - 6.4         Consider Prediabetes  >6.5              Consider Diabetes      Est. average glucose 12/27/2018 114  mg/dL Final    Comment: (NOTE)  The eAG should be interpreted with patient characteristics in mind   since ethnicity, interindividual differences, red cell lifespan,   variation in rates of glycation, etc. may affect the validity of the   calculation. Review of Systems   Constitutional: Negative for malaise/fatigue. Respiratory: Negative for shortness of breath. Cardiovascular: Negative for chest pain and leg swelling. Neurological: Negative for dizziness and headaches. Psychiatric/Behavioral: The patient does not have insomnia. Visit Vitals  /72 (BP 1 Location: Right arm, BP Patient Position: Sitting)   Pulse (!) 49   Temp 98.7 °F (37.1 °C) (Oral)   Resp 18   Ht 5' 8\" (1.727 m)   Wt 225 lb (102.1 kg)   SpO2 97%   BMI 34.21 kg/m²       Physical Exam  Constitutional:       General: He is not in acute distress. Appearance: Normal appearance. He is well-developed. HENT:      Head: Normocephalic and atraumatic. Right Ear: Tympanic membrane, ear canal and external ear normal.      Left Ear: Tympanic membrane, ear canal and external ear normal.      Nose: Nose normal.      Mouth/Throat:      Mouth: Mucous membranes are moist.      Pharynx: Uvula midline. No oropharyngeal exudate or posterior oropharyngeal erythema. Tonsils: No tonsillar abscesses. Eyes:      General: No scleral icterus. Conjunctiva/sclera: Conjunctivae normal.      Pupils: Pupils are equal, round, and reactive to light. Neck:      Musculoskeletal: Neck supple. Thyroid: No thyroid mass, thyromegaly or thyroid tenderness. Vascular: No carotid bruit. Cardiovascular:      Rate and Rhythm: Normal rate and regular rhythm. Pulses: Normal pulses. Dorsalis pedis pulses are 2+ on the right side and 2+ on the left side. Posterior tibial pulses are 2+ on the right side and 2+ on the left side. Heart sounds: Normal heart sounds. No murmur. No gallop. Pulmonary:      Effort: Pulmonary effort is normal. No respiratory distress. Breath sounds: Normal breath sounds. No decreased breath sounds, wheezing, rhonchi or rales. Musculoskeletal:      Right lower leg: No edema. Left lower leg: No edema. Lymphadenopathy:      Head:      Right side of head: No submandibular or tonsillar adenopathy. Left side of head: No submandibular or tonsillar adenopathy. Cervical: No cervical adenopathy. Upper Body:      Right upper body: No supraclavicular adenopathy. Left upper body: No supraclavicular adenopathy. Skin:     General: Skin is warm and dry. Neurological:      Mental Status: He is alert and oriented to person, place, and time. Psychiatric:         Speech: Speech normal.         ASSESSMENT and PLAN  Diagnoses and all orders for this visit:    1. Routine general medical examination at a health care facility  -     CBC W/O DIFF; Future  -     METABOLIC PANEL, COMPREHENSIVE; Future  -     HEMOGLOBIN A1C W/O EAG; Future  -     LIPID PANEL; Future  -     TSH 3RD GENERATION; Future  -     URINALYSIS W/ RFLX MICROSCOPIC; Future    2. Mixed hyperlipidemia  - Will review labs and make recommendation regarding resumption of Zocor. He is agreeable to resumption if recommended. Discussed if LDL still upper 170's, recommend resumption.  If more around previous range of 140's-150's, continue with lifestyle and monitoring. 3. Encounter for immunization  -     TETANUS, DIPHTHERIA TOXOIDS AND ACELLULAR PERTUSSIS VACCINE (TDAP), IN INDIVIDS. >=7, IM      Follow-up and Dispositions    · Return in about 1 year (around 1/29/2021) for CPE. Send my chart message regarding results.

## 2020-01-29 NOTE — PROGRESS NOTES
Rm:11    Chief Complaint   Patient presents with    Complete Physical     Depression Screening:  3 most recent [facility] Screens 1/29/2020 8/9/2019 3/18/2019 12/27/2018 12/27/2017 12/15/2016 11/17/2016   Little interest or pleasure in doing things Not at all Not at all Not at all Not at all Not at all Not at all Not at all   Feeling down, depressed, irritable, or hopeless Not at all Not at all Not at all Not at all Not at all Not at all Not at all   Total Score PHQ 2 0 0 0 0 0 0 0       Learning Assessment:  Learning Assessment 8/15/2016   PRIMARY LEARNER Patient   HIGHEST LEVEL OF EDUCATION - PRIMARY LEARNER  5 YEARS [address] CAREGIVER No   PRIMARY LANGUAGE ENGLISH    NEED No   LEARNER PREFERENCE PRIMARY DEMONSTRATION   LEARNING SPECIAL TOPICS no   ANSWERED BY sander   RELATIONSHIP SELF   ASSESSMENT COMMENT none       Abuse Screening:  Abuse Screening Questionnaire 8/9/2019   Do you ever feel afraid of your partner? N   Are you in a relationship with someone who physically or mentally threatens you? N   Is it safe for you to go home? Y       Health Maintenance reviewed and discussed per provider: yes     Coordination of Care:    1. Have you been to the ER, urgent care clinic since your last visit? Hospitalized since your last visit? no    2. Have you seen or consulted any other health care providers outside of the [address] [place] [address] since your last visit? Include any pap smears or colon screening. No      Presented for Tdap Vaccine. Administered in left deltoid without complaints. Pt observed for 5 min. No adverse reaction noted.  Pt left office in stable condition

## 2020-01-31 ENCOUNTER — PATIENT MESSAGE (OUTPATIENT)
Dept: INTERNAL MEDICINE CLINIC | Age: 40
End: 2020-01-31

## 2020-01-31 DIAGNOSIS — E78.5 DYSLIPIDEMIA: ICD-10-CM

## 2020-02-03 RX ORDER — SIMVASTATIN 20 MG/1
20 TABLET, FILM COATED ORAL
Qty: 90 TAB | Refills: 4 | Status: SHIPPED | OUTPATIENT
Start: 2020-02-03 | End: 2021-01-17 | Stop reason: SDUPTHER

## 2020-02-03 NOTE — TELEPHONE ENCOUNTER
From: Meño Flores  Sent: 1/31/2020 10:49 PM EST  To: EMMANUELLE Brock  Subject: RE:Lab results    Please provide a refill. Thank you  ----- Message -----  From: EMMANUELLE Brock  Sent: 1/31/2020 9:48 PM EST  To: Meño Flores  Subject: Lab results  Mr. Roby Wilde,  I have released and reviewed your lab results. Your LDL was 167 which is lower from last year's reading of 176. Because of your otherwise low risk for heart disease, we can continue to hold on medication and continue with your healthy lifestyle. However, if you would prefer to lower the LDL with medication, I can refill your simvastatin. Let me know your thoughts.     Thank you,  Amna Padilla PA-C

## 2020-09-03 ENCOUNTER — TELEPHONE (OUTPATIENT)
Dept: PULMONOLOGY | Age: 40
End: 2020-09-03

## 2020-09-03 DIAGNOSIS — F45.8 BRUXISM: ICD-10-CM

## 2020-09-03 DIAGNOSIS — G47.33 OSA (OBSTRUCTIVE SLEEP APNEA): Primary | ICD-10-CM

## 2020-09-03 DIAGNOSIS — R06.83 SNORING: ICD-10-CM

## 2020-09-03 NOTE — TELEPHONE ENCOUNTER
Message forwarded to Dr. Alysia Foley. I am req. a download from [facility] .    The pt. has a f/u appt. 9/23.

## 2020-09-14 NOTE — TELEPHONE ENCOUNTER
Order placed for CPAP Supplies, per Verbal Order from Dr. Naresh Pérez on 9/14/2020. Last office visit: 8/09/2019  Follow up Visit: 9/23/2020    Provider is aware of last office visit and follow up. No further action requested from provider.

## 2020-09-23 ENCOUNTER — OFFICE VISIT (OUTPATIENT)
Dept: PULMONOLOGY | Age: 40
End: 2020-09-23
Payer: COMMERCIAL

## 2020-09-23 VITALS
BODY MASS INDEX: 32.16 KG/M2 | RESPIRATION RATE: 20 BRPM | OXYGEN SATURATION: 98 % | TEMPERATURE: 98 F | SYSTOLIC BLOOD PRESSURE: 120 MMHG | HEART RATE: 59 BPM | HEIGHT: 68 IN | WEIGHT: 212.2 LBS | DIASTOLIC BLOOD PRESSURE: 80 MMHG

## 2020-09-23 DIAGNOSIS — E78.5 DYSLIPIDEMIA: ICD-10-CM

## 2020-09-23 DIAGNOSIS — G47.33 OSA (OBSTRUCTIVE SLEEP APNEA): Primary | ICD-10-CM

## 2020-09-23 DIAGNOSIS — E66.9 OBESITY (BMI 30-39.9): ICD-10-CM

## 2020-09-23 PROCEDURE — 99213 OFFICE O/P EST LOW 20 MIN: CPT | Performed by: INTERNAL MEDICINE

## 2020-09-23 NOTE — PROGRESS NOTES
The pt. Maintains use of CPap without problems. He is receiving supplies on a regular basis.     Flow sheets are done

## 2020-09-23 NOTE — Clinical Note
9/23/20 Patient: Jennifer Myers YOB: 1980 Date of Visit: 9/23/2020 Wilmer Sadler MD 
VIA Dear Wilmer Sadler MD, Thank you for referring Mr. Jennifer Myers to [address] [place] [address] for evaluation. My notes for this consultation are attached. If you have questions, please do not hesitate to call me. I look forward to following your patient along with you.  
 
 
Sincerely, 
 
Becca Lyons, DO

## 2020-09-23 NOTE — PROGRESS NOTES
[facility]  Pulmonary, Critical Care, and Sleep Medicine    Office Progress Note-Follow Up      Primary Care Physician: Heidi Silverio MD     Reason for Visit:  Follow Up JANINA- PAP Therapy    Assessment:  1. Obstructive Sleep Apnea (JANINA)- Moderate (AHI: 16.9)- Currently receiving clinical benefit from therapy and have very good compliance. He is using both a stationary device and travel device. I was not able to assess compliance data for travel device today. AHI a little high but he is doing well with a 90% pressure of 9.5. Will hold on current settings for now. 2. Hyperlipidemia  3. Bruxism- Has oral guard- working well for patient  4. Obesity: Body mass index is 32.26 kg/m². 5. Seasonal Allergies- Zyrtec, Has had nosebleeds with nasal steroid  6. Alcohol use: on weekends  7. Report of recurrent nosebleeds- currently well controlled. Plan:    · Continue APAP for now change from 5/15 to 7/15 cwp  · Can try taking Zyrtec at night and monitor ear fullness symptoms  · Continue with current oral guard and follow up with dentist.  · Renew PAP supplies  · Healthy lifestyle changes to include weight loss and exercise discussed. · Healthy sleep habits were reviewed and encouraged. ·  and workplace safety reviewed and discussed as appropriate. Drowsy and/or inattentive driving should be avoided. · COVID-19 precautions discussed to include: wearing mask in public, handwashing, social distancing and sleeping in a separate bedroom when using PAP therapy. · Follow-up in 10 months, sooner should new symptoms or problems arise. · Follow up with Primary Care Provider (PCP) as directed and for  BP, routine health care maintenance. History of Present Illness: Mr. Genna Ibrahim is a 45 y.o. male patient who presents today for routine follow up.     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 Motor vehicle accident(s) associated with drowsy driving:is denied by the patient     Since his last visit, he underwent home sleep testing and was noted to have moderate JANINA. He was subsequently started on APAP therapy. He reports the following today:    · Do to his work and frequent travel he bought a travel Respironics APAP device in addition to home unit- Since [facility] he has not traveled much so mostly using home device  · He is very happy with therapy. · Sleep is better consolidated and he has more daytime energy  · He reports his mask fell off one night and he woke from sleep gasping  · No skin breakdown  · No aerophagia or bloating  · He does note a little ear fullness but no pain or \"popping\"- He is not sure if it is from his PAP therapy or not    · The patient reports practicing good hand hygiene, social/physical distancing and wearing a mask/face covering when going out in public. Positive Airway Pressure (PAP) Compliance  Report & Summary Trends  DME: MED- [facility]    Date >4 hr use % Time  (Total Time%) AHI PAP Rx Pressure @ 90% Average Use Time Leak-large Notes   7/10-8/8/19 60 (63.3) 6.8 APAP 5/15 9.5 06:00:07 00:01:38 Has travel unit   6/12-/9/9/20 94.4 (96.7) 6.7 APAP 5/15 9.4 06:36:13 00:00:20                          Father: JANINA - uses CPAP    Stop Bang 9/23/2020 3/18/2019   Does the patient snore loudly (louder than talking or loud enough to be heard through closed doors)? 0 0   Does the patient often feel tired, fatigued, or sleepy during the daytime, even after a \"good\" night's sleep? 0 1   Has anyone ever observed the patient stop breathing during their sleep?  0 0   Does the patient have or are they being treated for high blood pressure? 0 0   Is the patient's BMI greater than 35? 0 0   Is your neck circumference greater than 17 inches (Male) or 16 inches (Female)?  1 1   Is the patient older than 48? 0 0   Is the patient male? 1 1   JANINA Score 2 3       3 most recent PHQ Screens 9/23/2020   Little interest or pleasure in doing things Not at all   Feeling down, depressed, irritable, or hopeless Not at all   Total Score PHQ 2 0       Walton Scale 9/23/2020 8/9/2019 3/18/2019   Sitting and Reading 0 0 1   Watching TV 0 1 1   Sitting, inactive in a public place (e.g. a movie theater or meeting) 0 0 0   As a passenger in a car for an hour, without a break 1 3 2   Lying down to rest in the afternoon, when circumstances permit 2 3 3   Sitting and talking to someone 0 0 0   Sitting quietly after lunch without alcohol 0 1 2   In a car, while stopped for a few minutes in traffic 0 0 0   Walton Sleepiness Score 3 8 9        Neck circ. in \"inches\": 17.5     Past Medical History:  Past Medical History:   Diagnosis Date    Elevated LDL cholesterol level     Sleep apnea, obstructive     with CPAP    Tinea corporis        Past Surgical History:  No past surgical history on file. Family History:  Family History   Problem Relation Age of Onset    No Known Problems Mother     High Cholesterol Father        Social History:    Caffeine Amount Time of last Intake Comments   Coffee 2 cups/am     Soda None     Tea None     Energy Drinks None     Over- the - counter stimulant pills None     Other Substances      Alcohol 8/ weekend  No drinking during the week   Tobacco None     Drugs None         Medications:  Current Outpatient Medications on File Prior to Visit   Medication Sig Dispense Refill    cpap machine kit by Does Not Apply route nightly. M.E.D.      simvastatin (ZOCOR) 20 mg tablet Take 1 Tab by mouth nightly. 90 Tab 4    cetirizine (ZYRTEC) 5 mg tablet Take 5 mg by mouth daily. No current facility-administered medications on file prior to visit.          Allergy:  No Known Allergies    Review of Systems  General ROS: negative for - chills, fatigue, fever, hot flashes, malaise, night sweats, weight gain or weight loss  ENT ROS: negative for - headaches, hearing change, nasal congestion, nasal discharge, nasal polyps, oral lesions, sinus pain, sneezing, sore throat, tinnitus, vertigo, visual changes or vocal changes, + for recurrent nose bleeds - worse in the winter time and with nasal steroid. + seasonal allergies. - not an issue at this time + ear fullness- no pain  Hematological and Lymphatic ROS: negative for - bleeding problems, blood clots, bruising, jaundice, pallor or swollen lymph nodes  Endocrine ROS: negative for - polydipsia/polyuria, skin changes, temperature intolerance or unexpected weight changes  Respiratory ROS: no cough, shortness of breath, or wheezing  Cardiovascular ROS: no chest pain or dyspnea on exertion  Gastrointestinal ROS: no abdominal pain, change in bowel habits, or black or bloody stools  Genito-Urinary ROS: no dysuria, trouble voiding, or hematuria  Musculoskeletal ROS: negative  Neurological ROS: no TIA or stroke symptoms  Dermatological ROS: negative for - pruritus, rash or skin lesion changes   Psychological ROS: negative   Otherwise negative. Physical Exam:  Blood pressure 120/80, pulse (!) 59, temperature 98 °F (36.7 °C), resp. rate 20, height 5' 8\" (1.727 m), weight 96.3 kg (212 lb 3.2 oz), SpO2 98 %. on RA , Body mass index is 32.26 kg/m². General:No distress, acyanotic, appears stated age, cooperative, pleasant  HEENT: Head size proportionally small, PERRL, EOMI, throat without erythema or exudate, Tongue with  dental indention on tongue, Mallampati's score 3+, Uvula- midline, + mild retrognathia, Tonsils- 1, Tympanic Membranes are clear: left ear canal smaller than right   Neck: Supple,  no abnormally enlarged lymph nodes, thyroid is not enlarged, non-tender, No carotid bruit.  No JVD  Chest: normal  Lungs: moderate air entry, clear to auscultation bilaterally- No wheezing  Heart: Regular rate and rhythm, S1S2 present, without murmur  Abdomen: Protuberant, abdomen is soft without significant tenderness, or guarding  Extremity: negative for edema, cyanosis or clubbing  Skin: Skin color, texture, turgor normal. No rashes or lesions, Short nails- patient picks at them  Neurological: CN 2-12 grossly intact, normal muscle tone    Data Reviewed:  CBC:   Lab Results   Component Value Date/Time    WBC 5.2 01/29/2020 08:59 AM    HGB 15.6 01/29/2020 08:59 AM    HCT 44.5 01/29/2020 08:59 AM    PLATELET 366 56/22/6034 08:59 AM    MCV 89.2 01/29/2020 08:59 AM       BMP:   Lab Results   Component Value Date/Time    Sodium 140 01/29/2020 08:59 AM    Potassium 4.7 01/29/2020 08:59 AM    Chloride 108 01/29/2020 08:59 AM    CO2 27 01/29/2020 08:59 AM    Anion gap 5 01/29/2020 08:59 AM    Glucose 87 01/29/2020 08:59 AM    BUN 15 01/29/2020 08:59 AM    Creatinine 1.00 01/29/2020 08:59 AM    BUN/Creatinine ratio 15 01/29/2020 08:59 AM    GFR est AA >60 01/29/2020 08:59 AM    GFR est non-AA >60 01/29/2020 08:59 AM    Calcium 9.2 01/29/2020 08:59 AM        TSH:  Lab Results   Component Value Date/Time    TSH 1.13 01/29/2020 08:59 AM    TSH 1.50 12/27/2018 09:21 AM       I  Historical Sleep Testing Data:    - HST: 4/9/19: AHI: 16.9, ASHTYN: 17.5, SpO2 rikki: 80%        Jose Ayala DO, FCCP  Pulmonary, Sleep and Critical Care Medicine

## 2021-01-15 ENCOUNTER — HOSPITAL ENCOUNTER (OUTPATIENT)
Dept: LAB | Age: 41
Discharge: HOME | End: 2021-01-15
Payer: COMMERCIAL

## 2021-01-15 ENCOUNTER — OFFICE VISIT (OUTPATIENT)
Dept: INTERNAL MEDICINE CLINIC | Age: 41
End: 2021-01-15
Payer: COMMERCIAL

## 2021-01-15 VITALS
HEART RATE: 57 BPM | TEMPERATURE: 96.9 F | WEIGHT: 218.8 LBS | OXYGEN SATURATION: 97 % | BODY MASS INDEX: 33.16 KG/M2 | DIASTOLIC BLOOD PRESSURE: 81 MMHG | SYSTOLIC BLOOD PRESSURE: 132 MMHG | HEIGHT: 68 IN | RESPIRATION RATE: 18 BRPM

## 2021-01-15 DIAGNOSIS — G47.33 OSA ON CPAP: ICD-10-CM

## 2021-01-15 DIAGNOSIS — Z99.89 OSA ON CPAP: ICD-10-CM

## 2021-01-15 DIAGNOSIS — E78.5 DYSLIPIDEMIA: ICD-10-CM

## 2021-01-15 DIAGNOSIS — Z00.00 ROUTINE GENERAL MEDICAL EXAMINATION AT A HEALTH CARE FACILITY: Primary | ICD-10-CM

## 2021-01-15 DIAGNOSIS — R79.89 ELEVATED LFTS: ICD-10-CM

## 2021-01-15 DIAGNOSIS — Z00.00 ROUTINE GENERAL MEDICAL EXAMINATION AT A HEALTH CARE FACILITY: ICD-10-CM

## 2021-01-15 LAB
ALBUMIN SERPL-MCNC: 4.1 G/DL (ref 3.4–5)
ALBUMIN/GLOB SERPL: 1.2 {RATIO} (ref 0.8–1.7)
ALP SERPL-CCNC: 58 U/L (ref 45–117)
ALT SERPL-CCNC: 79 U/L (ref 16–61)
ANION GAP SERPL CALC-SCNC: 6 MMOL/L (ref 3–18)
APPEARANCE UR: CLEAR
AST SERPL-CCNC: 31 U/L (ref 10–38)
BILIRUB SERPL-MCNC: 0.6 MG/DL (ref 0.2–1)
BILIRUB UR QL: NEGATIVE
BUN SERPL-MCNC: 16 MG/DL (ref 7–18)
BUN/CREAT SERPL: 15 (ref 12–20)
CALCIUM SERPL-MCNC: 9.4 MG/DL (ref 8.5–10.1)
CHLORIDE SERPL-SCNC: 109 MMOL/L (ref 100–111)
CHOLEST SERPL-MCNC: 168 MG/DL
CO2 SERPL-SCNC: 29 MMOL/L (ref 21–32)
COLOR UR: YELLOW
CREAT SERPL-MCNC: 1.1 MG/DL (ref 0.6–1.3)
ERYTHROCYTE [DISTWIDTH] IN BLOOD BY AUTOMATED COUNT: 12.6 % (ref 11.6–14.5)
GLOBULIN SER CALC-MCNC: 3.5 G/DL (ref 2–4)
GLUCOSE SERPL-MCNC: 86 MG/DL (ref 74–99)
GLUCOSE UR STRIP.AUTO-MCNC: NEGATIVE MG/DL
HBA1C MFR BLD: 5.2 % (ref 4.2–5.6)
HCT VFR BLD AUTO: 45.5 % (ref 36–48)
HDLC SERPL-MCNC: 63 MG/DL (ref 40–60)
HDLC SERPL: 2.7 {RATIO} (ref 0–5)
HGB BLD-MCNC: 15.7 G/DL (ref 13–16)
HGB UR QL STRIP: NEGATIVE
KETONES UR QL STRIP.AUTO: NEGATIVE MG/DL
LDLC SERPL CALC-MCNC: 91.6 MG/DL (ref 0–100)
LEUKOCYTE ESTERASE UR QL STRIP.AUTO: NEGATIVE
LIPID PROFILE,FLP: ABNORMAL
MCH RBC QN AUTO: 30.6 PG (ref 24–34)
MCHC RBC AUTO-ENTMCNC: 34.5 G/DL (ref 31–37)
MCV RBC AUTO: 88.7 FL (ref 74–97)
NITRITE UR QL STRIP.AUTO: NEGATIVE
PH UR STRIP: 5.5 [PH] (ref 5–8)
PLATELET # BLD AUTO: 233 K/UL (ref 135–420)
PMV BLD AUTO: 10.3 FL (ref 9.2–11.8)
POTASSIUM SERPL-SCNC: 4.7 MMOL/L (ref 3.5–5.5)
PROT SERPL-MCNC: 7.6 G/DL (ref 6.4–8.2)
PROT UR STRIP-MCNC: NEGATIVE MG/DL
RBC # BLD AUTO: 5.13 M/UL (ref 4.7–5.5)
SODIUM SERPL-SCNC: 144 MMOL/L (ref 136–145)
SP GR UR REFRACTOMETRY: 1.03 (ref 1–1.03)
TRIGL SERPL-MCNC: 67 MG/DL (ref ?–150)
TSH SERPL DL<=0.05 MIU/L-ACNC: 1.36 UIU/ML (ref 0.36–3.74)
UROBILINOGEN UR QL STRIP.AUTO: 0.2 EU/DL (ref 0.2–1)
VLDLC SERPL CALC-MCNC: 13.4 MG/DL
WBC # BLD AUTO: 5.3 K/UL (ref 4.6–13.2)

## 2021-01-15 PROCEDURE — 84443 ASSAY THYROID STIM HORMONE: CPT

## 2021-01-15 PROCEDURE — 83036 HEMOGLOBIN GLYCOSYLATED A1C: CPT

## 2021-01-15 PROCEDURE — 81003 URINALYSIS AUTO W/O SCOPE: CPT

## 2021-01-15 PROCEDURE — 80053 COMPREHEN METABOLIC PANEL: CPT

## 2021-01-15 PROCEDURE — 99395 PREV VISIT EST AGE 18-39: CPT | Performed by: PHYSICIAN ASSISTANT

## 2021-01-15 PROCEDURE — 80061 LIPID PANEL: CPT

## 2021-01-15 PROCEDURE — 36415 COLL VENOUS BLD VENIPUNCTURE: CPT

## 2021-01-15 PROCEDURE — 85027 COMPLETE CBC AUTOMATED: CPT

## 2021-01-15 NOTE — PROGRESS NOTES
Blanco Campos is a 45 y.o. male (: 1980) presenting to address:    Chief Complaint   Patient presents with    Physical       Vitals:    01/15/21 0756   BP: 132/81   Pulse: (!) 57   Resp: 18   Temp: 96.9 °F (36.1 °C)   TempSrc: Oral   SpO2: 97%   Weight: 218 lb 12.8 oz (99.2 kg)   Height: 5' 8\" (1.727 m)   PainSc:   0 - No pain       Hearing/Vision:      Hearing Screening    125Hz 250Hz 500Hz 1000Hz 2000Hz 3000Hz 4000Hz 6000Hz 8000Hz   Right ear:            Left ear:               Visual Acuity Screening    Right eye Left eye Both eyes   Without correction:      With correction: 20/25 20/25 20/20       Learning Assessment:     Learning Assessment 1/15/2021   PRIMARY LEARNER Patient   HIGHEST LEVEL OF EDUCATION - PRIMARY LEARNER  4 YEARS OF COLLEGE   BARRIERS PRIMARY LEARNER NONE   CO-LEARNER CAREGIVER No   PRIMARY LANGUAGE ENGLISH    NEED -   LEARNER PREFERENCE PRIMARY DEMONSTRATION   LEARNING SPECIAL TOPICS -   ANSWERED BY patient    RELATIONSHIP SELF   ASSESSMENT COMMENT -     Depression Screening:     3 most recent PHQ Screens 1/15/2021   Little interest or pleasure in doing things Not at all   Feeling down, depressed, irritable, or hopeless Not at all   Total Score PHQ 2 0     Fall Risk Assessment:     Fall Risk Assessment, last 12 mths 2020   Able to walk? Yes   Fall in past 12 months? No     Abuse Screening:     Abuse Screening Questionnaire 2020   Do you ever feel afraid of your partner? N   Are you in a relationship with someone who physically or mentally threatens you? N   Is it safe for you to go home? Y     Coordination of Care Questionaire:   1. Have you been to the ER, urgent care clinic since your last visit? Hospitalized since your last visit? NO    2. Have you seen or consulted any other health care providers outside of the [address] [place] [address] since your last visit? Include any pap smears or colon screening. YES dentist, Dr. Anne-Marie Tejada Directive:   1.  Do you have an Advanced Directive? NO    2. Would you like information on Advanced Directives? NO

## 2021-01-15 NOTE — PROGRESS NOTES
HISTORY OF PRESENT ILLNESS  Bonilla Lange is a 45 y.o. male. HPI  Routine CPE. 1) HLD - no lab since Simvastatin resumption. Reports good compliance with Simvastatin at present. Lab Results   Component Value Date/Time    Cholesterol, total 242 (H) 01/29/2020 08:59 AM    HDL Cholesterol 56 01/29/2020 08:59 AM    LDL, calculated 167.4 (H) 01/29/2020 08:59 AM    VLDL, calculated 18.6 01/29/2020 08:59 AM    Triglyceride 93 01/29/2020 08:59 AM    CHOL/HDL Ratio 4.3 01/29/2020 08:59 AM       2) Sleep apnea - reports good compliance with CPAP. UTD on yearly f/u - 9/23/2020. Review of Systems   Constitutional: Negative for malaise/fatigue. Respiratory: Negative for shortness of breath. Cardiovascular: Negative for chest pain and leg swelling. Neurological: Negative for dizziness and headaches. Visit Vitals  /81 (BP 1 Location: Right arm, BP Patient Position: Sitting)   Pulse (!) 57   Temp 96.9 °F (36.1 °C) (Oral)   Resp 18   Ht 5' 8\" (1.727 m)   Wt 218 lb 12.8 oz (99.2 kg)   SpO2 97%   BMI 33.27 kg/m²       Physical Exam  Constitutional:       General: He is not in acute distress. Appearance: Normal appearance. He is well-developed. HENT:      Head: Normocephalic and atraumatic. Right Ear: Tympanic membrane, ear canal and external ear normal.      Left Ear: Tympanic membrane, ear canal and external ear normal.      Nose: Nose normal.      Mouth/Throat:      Comments: Mask  Eyes:      General: No scleral icterus. Conjunctiva/sclera: Conjunctivae normal.      Pupils: Pupils are equal, round, and reactive to light. Neck:      Musculoskeletal: Neck supple. Cardiovascular:      Rate and Rhythm: Normal rate and regular rhythm. Pulses: Normal pulses. Dorsalis pedis pulses are 2+ on the right side and 2+ on the left side. Posterior tibial pulses are 2+ on the right side and 2+ on the left side. Heart sounds: Normal heart sounds. No murmur. No gallop. Pulmonary:      Effort: Pulmonary effort is normal. No respiratory distress. Breath sounds: Normal breath sounds. No decreased breath sounds, wheezing, rhonchi or rales. Musculoskeletal:      Right lower leg: No edema. Left lower leg: No edema. Lymphadenopathy:      Head:      Right side of head: No submandibular or tonsillar adenopathy. Left side of head: No submandibular or tonsillar adenopathy. Cervical: No cervical adenopathy. Upper Body:      Right upper body: No supraclavicular adenopathy. Left upper body: No supraclavicular adenopathy. Skin:     General: Skin is warm and dry. Neurological:      Mental Status: He is alert and oriented to person, place, and time. Psychiatric:         Speech: Speech normal.         ASSESSMENT and PLAN  Diagnoses and all orders for this visit:    1. Routine general medical examination at a health care facility  -     CBC W/O DIFF; Future  -     METABOLIC PANEL, COMPREHENSIVE; Future  -     LIPID PANEL; Future  -     HEMOGLOBIN A1C W/O EAG; Future  -     TSH 3RD GENERATION; Future  -     URINALYSIS W/ RFLX MICROSCOPIC; Future    2. Dyslipidemia  -     METABOLIC PANEL, COMPREHENSIVE; Future  -     LIPID PANEL; Future  - Will review lab and recommend either continuance of current dosage or possible dosage increase. He agrees. 3. JANINA on CPAP  - Cont routine f/u and good CPAP compliance. Follow-up and Dispositions    · Return in about 1 year (around 1/15/2022) for Schedule CPE.

## 2021-01-17 RX ORDER — SIMVASTATIN 20 MG/1
20 TABLET, FILM COATED ORAL
Qty: 90 TAB | Refills: 4 | Status: SHIPPED | OUTPATIENT
Start: 2021-01-17 | End: 2022-01-17 | Stop reason: SDUPTHER

## 2021-07-27 ENCOUNTER — HOSPITAL ENCOUNTER (OUTPATIENT)
Dept: LAB | Age: 41
Discharge: HOME | End: 2021-07-27
Payer: COMMERCIAL

## 2021-07-27 ENCOUNTER — OFFICE VISIT (OUTPATIENT)
Dept: INTERNAL MEDICINE CLINIC | Age: 41
End: 2021-07-27
Payer: COMMERCIAL

## 2021-07-27 VITALS
BODY MASS INDEX: 32.89 KG/M2 | HEART RATE: 67 BPM | RESPIRATION RATE: 20 BRPM | OXYGEN SATURATION: 96 % | DIASTOLIC BLOOD PRESSURE: 89 MMHG | WEIGHT: 217 LBS | SYSTOLIC BLOOD PRESSURE: 138 MMHG | TEMPERATURE: 96.9 F | HEIGHT: 68 IN

## 2021-07-27 DIAGNOSIS — Z11.59 ENCOUNTER FOR HEPATITIS C SCREENING TEST FOR LOW RISK PATIENT: ICD-10-CM

## 2021-07-27 DIAGNOSIS — K76.0 STEATOSIS OF LIVER: ICD-10-CM

## 2021-07-27 DIAGNOSIS — Z11.3 SCREENING FOR STD (SEXUALLY TRANSMITTED DISEASE): ICD-10-CM

## 2021-07-27 DIAGNOSIS — E78.2 MIXED HYPERLIPIDEMIA: ICD-10-CM

## 2021-07-27 DIAGNOSIS — Z11.3 SCREENING FOR STD (SEXUALLY TRANSMITTED DISEASE): Primary | ICD-10-CM

## 2021-07-27 DIAGNOSIS — L30.4 INTERTRIGO: ICD-10-CM

## 2021-07-27 LAB
ALBUMIN SERPL-MCNC: 4.3 G/DL (ref 3.4–5)
ALBUMIN/GLOB SERPL: 1.3 {RATIO} (ref 0.8–1.7)
ALP SERPL-CCNC: 57 U/L (ref 45–117)
ALT SERPL-CCNC: 70 U/L (ref 16–61)
AST SERPL-CCNC: 32 U/L (ref 10–38)
BILIRUB DIRECT SERPL-MCNC: 0.2 MG/DL (ref 0–0.2)
BILIRUB SERPL-MCNC: 0.8 MG/DL (ref 0.2–1)
GLOBULIN SER CALC-MCNC: 3.4 G/DL (ref 2–4)
PROT SERPL-MCNC: 7.7 G/DL (ref 6.4–8.2)

## 2021-07-27 PROCEDURE — 87521 HEPATITIS C PROBE&RVRS TRNSC: CPT

## 2021-07-27 PROCEDURE — 87340 HEPATITIS B SURFACE AG IA: CPT

## 2021-07-27 PROCEDURE — 86780 TREPONEMA PALLIDUM: CPT

## 2021-07-27 PROCEDURE — 86704 HEP B CORE ANTIBODY TOTAL: CPT

## 2021-07-27 PROCEDURE — 80076 HEPATIC FUNCTION PANEL: CPT

## 2021-07-27 PROCEDURE — 36415 COLL VENOUS BLD VENIPUNCTURE: CPT

## 2021-07-27 PROCEDURE — 99214 OFFICE O/P EST MOD 30 MIN: CPT | Performed by: INTERNAL MEDICINE

## 2021-07-27 PROCEDURE — 82977 ASSAY OF GGT: CPT

## 2021-07-27 PROCEDURE — 87389 HIV-1 AG W/HIV-1&-2 AB AG IA: CPT

## 2021-07-27 RX ORDER — NYSTATIN 100000 [USP'U]/G
POWDER TOPICAL 4 TIMES DAILY
Qty: 1 BOTTLE | Refills: 1 | Status: SHIPPED | OUTPATIENT
Start: 2021-07-27 | End: 2022-01-17 | Stop reason: ALTCHOICE

## 2021-07-27 NOTE — PROGRESS NOTES
Progress Note    Patient: Lita Lock               Sex: male                  YOB: 1980      Age:  37 y.o.                    HPI:     Lita Lock is a 37 y.o. male who has been seen for   STD testing . Just started a new sexual relationship . He also has some intertrigo . He is followed for  Hyperlipidemia . Denies any symptoms or signs of STD . Past Medical History:   Diagnosis Date    Elevated LDL cholesterol level     Sleep apnea, obstructive     with CPAP    Tinea corporis        History reviewed. No pertinent surgical history. Family History   Problem Relation Age of Onset    No Known Problems Mother     High Cholesterol Father        Social History     Socioeconomic History    Marital status: SINGLE     Spouse name: Not on file    Number of children: Not on file    Years of education: Not on file    Highest education level: Not on file   Tobacco Use    Smoking status: Never Smoker    Smokeless tobacco: Never Used   Substance and Sexual Activity    Alcohol use: Yes     Alcohol/week: 2.0 standard drinks     Types: 2 Cans of beer per week    Drug use: No    Sexual activity: Yes     Social Determinants of Health     Financial Resource Strain:     Difficulty of Paying Living Expenses:    Food Insecurity:     Worried About Running Out of Food in the Last Year:     [address] in the Last Year:    Transportation Needs:     Lack of Transportation (Medical):      Lack of Transportation (Non-Medical):    Physical Activity:     Days of Exercise per Week:     Minutes of Exercise per Session:    Stress:     Feeling of Stress :    Social Connections:     Frequency of Communication with Friends and Family:     Frequency of Social Gatherings with Friends and Family:     Attends Hinduism Services:     Active Member of Clubs or Organizations:     Attends Club or Organization Meetings:     Marital Status:          Current Outpatient Medications:     simvastatin (ZOCOR) 20 mg tablet, Take 1 Tab by mouth nightly., Disp: 90 Tab, Rfl: 4    cpap machine kit, by Does Not Apply route nightly. M.E.D., Disp: , Rfl:     cetirizine (ZYRTEC) 10 mg tablet, Take 10 mg by mouth daily. , Disp: , Rfl:      No Known Allergies    Review of Systems   Constitutional: Negative for chills and fever. Respiratory: Negative for cough. Cardiovascular: Negative for chest pain. Physical Exam:      Visit Vitals  /89 (BP 1 Location: Right upper arm, BP Patient Position: Sitting, BP Cuff Size: Large adult)   Pulse 67   Temp 96.9 °F (36.1 °C) (Temporal)   Resp 20   Ht 5' 8\" (1.727 m)   Wt 217 lb (98.4 kg)   SpO2 96%   BMI 32.99 kg/m²       Physical Exam  Constitutional:       Appearance: Normal appearance. Cardiovascular:      Rate and Rhythm: Normal rate and regular rhythm. Heart sounds: No murmur heard. No friction rub. No gallop. Neurological:      General: No focal deficit present. Mental Status: He is alert and oriented to person, place, and time. Cranial Nerves: No cranial nerve deficit. Sensory: No sensory deficit. Psychiatric:         Mood and Affect: Mood normal.         Behavior: Behavior normal.          Labs Reviewed:  LDL down nicely. LDL now in 90 range  , SGPT was 79 in Jan 21    Assessment/Plan       ICD-10-CM ICD-9-CM    1. Screening for STD (sexually transmitted disease)  Z11.3 V74.5 HIV 1/2 AG/AB, 4TH GENERATION,W RFLX CONFIRM      T PALLIDUM AB, BY FTA-ABS   2. Mixed hyperlipidemia  E78.2 272.2    3. Intertrigo  L30.4 695.89 nystatin (MYCOSTATIN) powder   4. Steatosis of liver  K76.0 571.8 HEPATIC FUNCTION PANEL      HEP B SURFACE AG      HEPATITIS B CORE AB, TOTAL      GGT   5. Encounter for hepatitis C screening test for low risk patient  Z11.59 V73.89 HCV RNA JOSE QUALITATIVE   he admits to 12 drinks over the typical weekend . Cautioned re this and possible cirrhosis  eventually . Lipids are much better on zocor.       spent 30 mins on this visit Reyna Johnson MD

## 2021-07-27 NOTE — PROGRESS NOTES
Karen Driver is a 37 y.o. male (: 1980) presenting to address:    Chief Complaint   Patient presents with    Exposure to STD     patient requesting testing        Vitals:    21 0756   BP: 138/89   Pulse: 67   Resp: 20   Temp: 96.9 °F (36.1 °C)   TempSrc: Temporal   SpO2: 96%   Weight: 217 lb (98.4 kg)   Height: 5' 8\" (1.727 m)   PainSc:   0 - No pain       Hearing/Vision:   No exam data present    Learning Assessment:     Learning Assessment 1/15/2021   PRIMARY LEARNER Patient   HIGHEST LEVEL OF EDUCATION - PRIMARY LEARNER  4 YEARS OF COLLEGE   BARRIERS PRIMARY LEARNER NONE   CO-LEARNER CAREGIVER No   PRIMARY LANGUAGE ENGLISH    NEED -   LEARNER PREFERENCE PRIMARY DEMONSTRATION   LEARNING SPECIAL TOPICS -   ANSWERED BY patient    RELATIONSHIP SELF   ASSESSMENT COMMENT -     Depression Screening:     3 most recent PHQ Screens 2021   Little interest or pleasure in doing things Not at all   Feeling down, depressed, irritable, or hopeless Not at all   Total Score PHQ 2 0     Fall Risk Assessment:     Fall Risk Assessment, last 12 mths 2020   Able to walk? Yes   Fall in past 12 months? No     Abuse Screening:     Abuse Screening Questionnaire 2020   Do you ever feel afraid of your partner? N   Are you in a relationship with someone who physically or mentally threatens you? N   Is it safe for you to go home? Y     Coordination of Care Questionaire:   1. Have you been to the ER, urgent care clinic since your last visit? Hospitalized since your last visit? NO    2. Have you seen or consulted any other health care providers outside of the [address] [place] [address] since your last visit? Include any pap smears or colon screening. YES    Advanced Directive:   1. Do you have an Advanced Directive? NO    2. Would you like information on Advanced Directives?  NO

## 2021-07-28 LAB
GGT SERPL-CCNC: 74 U/L (ref 15–85)
HBV SURFACE AG SER QL: <0.1 INDEX
HBV SURFACE AG SER QL: NEGATIVE
HIV 1+2 AB+HIV1 P24 AG SERPL QL IA: NONREACTIVE
HIV12 RESULT COMMENT, HHIVC: NORMAL

## 2021-07-29 LAB
HBV CORE AB SERPL QL IA: NEGATIVE
T PALLIDUM AB SER QL IF: NON REACTIVE

## 2021-07-30 LAB — HCV RNA SERPL QL NAA+PROBE: NEGATIVE

## 2021-09-30 ENCOUNTER — OFFICE VISIT (OUTPATIENT)
Dept: PULMONOLOGY | Age: 41
End: 2021-09-30
Payer: COMMERCIAL

## 2021-09-30 VITALS
BODY MASS INDEX: 32.31 KG/M2 | HEART RATE: 61 BPM | RESPIRATION RATE: 18 BRPM | HEIGHT: 68 IN | WEIGHT: 213.2 LBS | OXYGEN SATURATION: 97 % | SYSTOLIC BLOOD PRESSURE: 131 MMHG | TEMPERATURE: 97.9 F | DIASTOLIC BLOOD PRESSURE: 85 MMHG

## 2021-09-30 DIAGNOSIS — E78.5 DYSLIPIDEMIA: ICD-10-CM

## 2021-09-30 DIAGNOSIS — E66.9 OBESITY (BMI 30-39.9): ICD-10-CM

## 2021-09-30 DIAGNOSIS — R06.83 SNORING: ICD-10-CM

## 2021-09-30 DIAGNOSIS — G47.33 OSA (OBSTRUCTIVE SLEEP APNEA): Primary | ICD-10-CM

## 2021-09-30 PROCEDURE — 99213 OFFICE O/P EST LOW 20 MIN: CPT | Performed by: INTERNAL MEDICINE

## 2021-09-30 NOTE — PROGRESS NOTES
[facility]  Pulmonary, Critical Care, and Sleep Medicine    Office Progress Note-Follow Up      Primary Care Physician: Blaze Petersen     Reason for Visit:  Follow Up JANINA- PAP Therapy    Assessment:  1. Obstructive Sleep Apnea (JANINA)- Moderate (AHI: 16.9)- Currently receiving clinical benefit from therapy and have very good compliance. He is using both a stationary device and travel device. Will increase CPAP low to 8 as AHI is still a little high. We discussed recall issue and that the patient will continue to use his PAP devices. Will try to replace but if not will use of a biofilter may not afford any clinical benefit but it may at least stop some particulate matter  2. Hyperlipidemia  3. Bruxism- Has oral guard- working well for patient  4. Obesity: Body mass index is 32.42 kg/m². 5. Seasonal Allergies- Zyrtec, Has had nosebleeds with nasal steroid- no issue at this time  6. Alcohol use: on weekends  7. Report of recurrent nosebleeds- currently well controlled. Plan:    · Patient needs to register his devices- Will try to get device replaced if possible  · Continue APAP for now change from 7/15 to 8/15 cwp  · Renew supplies  · Continue Zyrtec PRN  ·    ·    · Healthy lifestyle changes to include weight loss and exercise discussed. · Healthy sleep habits were reviewed and encouraged. ·  and workplace safety reviewed and discussed as appropriate. Drowsy and/or inattentive driving should be avoided. · COVID-19 precautions per CDC guidelines  · Follow-up in 6 months, sooner should new symptoms or problems arise. · Follow up with Primary Care Provider (PCP) as directed and for  BP, routine health care maintenance. History of Present Illness: Mr. Raquel Kinney is a 37 y.o. male patient who presents today for routine follow up.     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 Motor vehicle accident(s) associated with drowsy driving:is denied by the patient     Today the following is noted and/or reported:    · The patient has both a travel and home [employer] APAP device. His devices may be    under recall. He states he was not received notification from DME but he is aware of the recall. I discussed that [employer] is in the process of replacing or repairing devices which can take more than 2 years at this point. · He has not registered his device as of yet  · He is still very happy with therapy. · Sleep is better consolidated and he has more daytime energy  · He states that he is tired and does not function well the following day if he does not use his device  · No skin breakdown  · No aerophagia or bloating  · No Xerostomia    · He reports that he got his flu shot last week and he has gotten his COVID-19 vaccination      Positive Airway Pressure (PAP) Compliance  Report & Summary Trends  [facility]: [facility]    Date >4 hr use % Time  (Total Time%) AHI PAP Rx Pressure @ 90% Average Use Time Leak-large Notes   7/10-8/8/19 60 (63.3) 6.8 APAP 5/15 9.5 06:00:07 00:01:38 Has travel unit   6/12-/9/9/20 94.4 (96.7) 6.7 APAP 5/15 9.4 06:36:13 00:00:20    7/1/21-9/28/21 86.7 (86.7) 5.0 APAP 7/13 9.7 06:58:54 00:02:21                Father: JANINA - uses CPAP    Stop Bang 9/23/2020 3/18/2019   Does the patient snore loudly (louder than talking or loud enough to be heard through closed doors)? 0 0   Does the patient often feel tired, fatigued, or sleepy during the daytime, even after a \"good\" night's sleep? 0 1   Has anyone ever observed the patient stop breathing during their sleep?  0 0   Does the patient have or are they being treated for high blood pressure? 0 0   Is the patient's BMI greater than 35? 0 0   Is your neck circumference greater than 17 inches (Male) or 16 inches (Female)?  1 1 Is the patient older than 48? 0 0   Is the patient male? 1 1   JANINA Score 2 3       3 most recent PHQ Screens 9/30/2021   Little interest or pleasure in doing things Not at all   Feeling down, depressed, irritable, or hopeless Not at all   Total Score PHQ 2 0       [place] Scale 9/30/2021 9/23/2020 8/9/2019 3/18/2019   Sitting and Reading 0 0 0 1   Watching TV 1 0 1 1   Sitting, inactive in a public place (e.g. a movie theater or meeting) 0 0 0 0   As a passenger in a car for an hour, without a break 2 1 3 2   Lying down to rest in the afternoon, when circumstances permit 2 2 3 3   Sitting and talking to someone 0 0 0 0   Sitting quietly after lunch without alcohol 0 0 1 2   In a car, while stopped for a few minutes in traffic 0 0 0 0   [place] Sleepiness Score 5 3 8 9        Immunization History   Administered Date(s) Administered    COVID-19, PFIZER, MRNA, LNP-S, PF, 30MCG/0.3ML DOSE 03/05/2021, 03/25/2021    Influenza Vaccine 12/01/2019    Influenza Vaccine (Quad) PF (>6 Mo Flulaval, Fluarix, and >3 Yrs [address] [place] [address], Fluzone [facility]) 12/27/2017, 09/28/2020    Tdap 01/29/2020         Past Medical History:  Past Medical History:   Diagnosis Date    Elevated LDL cholesterol level     Sleep apnea, obstructive     with CPAP    Tinea corporis        Past Surgical History:  History reviewed. No pertinent surgical history. Family History:  Family History   Problem Relation Age of Onset    No Known Problems Mother     High Cholesterol Father        Social History:    Caffeine Amount Time of last Intake Comments   Coffee 2 cups/am     Soda None     Tea None     Energy Drinks None     Over- the - counter stimulant pills None     Other Substances      Alcohol 8/ weekend  No drinking during the week   Tobacco None     Drugs None         Medications:  Current Outpatient Medications on File Prior to Visit   Medication Sig Dispense Refill    nystatin (MYCOSTATIN) powder Apply  to affected area four (4) times daily.  1 Bottle 1    simvastatin (ZOCOR) 20 mg tablet Take 1 Tab by mouth nightly. 90 Tab 4    cpap machine kit by Does Not Apply route nightly. M.E.D.      cetirizine (ZYRTEC) 10 mg tablet Take 10 mg by mouth daily. No current facility-administered medications on file prior to visit. Allergy:  No Known Allergies    Review of Systems  General ROS: negative for - chills, fatigue, fever, hot flashes, malaise, night sweats, weight gain or weight loss  ENT ROS: negative for - headaches, hearing change, nasal congestion, nasal discharge, nasal polyps, oral lesions, sinus pain, sneezing, sore throat, tinnitus, vertigo, visual changes or vocal changes, + for h/o  recurrent nose bleeds - worse in the winter time and with nasal steroid. + seasonal allergies. - not an issue at this time + ear fullness- no pain  Hematological and Lymphatic ROS: negative for - bleeding problems, blood clots, bruising, jaundice, pallor or swollen lymph nodes  Endocrine ROS: negative for - polydipsia/polyuria, skin changes, temperature intolerance or unexpected weight changes  Respiratory ROS: no cough, shortness of breath, or wheezing  Cardiovascular ROS: no chest pain or dyspnea on exertion  Gastrointestinal ROS: no abdominal pain, change in bowel habits, or black or bloody stools  Genito-Urinary ROS: no dysuria, trouble voiding, or hematuria  Musculoskeletal ROS: negative  Neurological ROS: no TIA or stroke symptoms  Dermatological ROS: negative for - pruritus, rash or skin lesion changes   Psychological ROS: negative   Otherwise negative and per HPI      Physical Exam:  Blood pressure 131/85, pulse 61, temperature 97.9 °F (36.6 °C), temperature source Temporal, resp. rate 18, height 5' 8\" (1.727 m), weight 96.7 kg (213 lb 3.2 oz), SpO2 97 %. on RA , Body mass index is 32.42 kg/m².      General:No distress, acyanotic, appears stated age, cooperative, pleasant  HEENT: Head size proportionally small and narrow , PERRL, EOMI, throat without erythema or exudate, Tongue with  dental indention on tongue, Mallampati's score 3+, Uvula- midline, + mild retrognathia, Tonsils- 1,   Neck: Supple,  no abnormally enlarged lymph nodes, thyroid is not enlarged, non-tender, No carotid bruit. No JVD  Chest: Grossly normal  Lungs:  Moderate air entry, clear to auscultation bilaterally- No wheezing  Heart: Regular rate and rhythm, S1S2 present, without murmur  Abdomen: Protuberant, abdomen is soft without significant tenderness, or guarding  Extremity: negative for edema, cyanosis or clubbing  Skin: Skin color, texture, turgor normal. No rashes or lesions, Short nails- patient picks at them  Neurological: CN 2-12 grossly intact, normal muscle tone    Data Reviewed:  CBC:   Lab Results   Component Value Date/Time    WBC 5.3 01/15/2021 09:02 AM    HGB 15.7 01/15/2021 09:02 AM    HCT 45.5 01/15/2021 09:02 AM    PLATELET 322 86/31/5986 09:02 AM    MCV 88.7 01/15/2021 09:02 AM       BMP:   Lab Results   Component Value Date/Time    Sodium 144 01/15/2021 09:02 AM    Potassium 4.7 01/15/2021 09:02 AM    Chloride 109 01/15/2021 09:02 AM    CO2 29 01/15/2021 09:02 AM    Anion gap 6 01/15/2021 09:02 AM    Glucose 86 01/15/2021 09:02 AM    BUN 16 01/15/2021 09:02 AM    Creatinine 1.10 01/15/2021 09:02 AM    BUN/Creatinine ratio 15 01/15/2021 09:02 AM    GFR est AA >60 01/15/2021 09:02 AM    GFR est non-AA >60 01/15/2021 09:02 AM    Calcium 9.4 01/15/2021 09:02 AM        TSH:  Lab Results   Component Value Date/Time    TSH 1.36 01/15/2021 09:02 AM    TSH 1.13 01/29/2020 08:59 AM    TSH 1.50 12/27/2018 09:21 AM       I  Historical Sleep Testing Data:    - HST: 4/9/19: AHI: 16.9, ASHTYN: 17.5, SpO2 rikki: 80%        Gino Anchors, DO, FCCP  Pulmonary, Sleep and Critical Care Medicine

## 2021-09-30 NOTE — PATIENT INSTRUCTIONS
Please be aware that your current PAP device may be under a general recall by the manufacture; [address] Please refer to this following website: [email or web address] or [employer]./healthcare/e/sleep/communications/src-update  Please look under the portion labeled, Patients, Users or Caregivers  and follow provided instructions. Please call our clinic back at [phone] if you have not received a follow up appointment within 30 days prior the recommended follow up time. Please also call our office if you are not tolerating treatment plan and/or if you are experiencing any difficulties with the [employer]  [employer] you may be using or is assigned to you. If you have a CPAP/BIPAP or home ventilator device, your DME company is supposed to provide you with replacement filters, tubing and masks. You can either call them when you need new supplies or you can arrange for an automatic shipment schedule. Your need to be seen by our office at least annually to renew the prescription for these supplies. Please make note of who your DME company is and their phone number. Please make sure that you clean your mask and hosing on a regular basis.   Your DME can provide you with additional information regarding proper care and cleaning of your device- Thank you

## 2021-09-30 NOTE — LETTER
9/30/2021    Patient: Anshul Grewal   YOB: 1980   Date of Visit: 9/30/2021     Marielena Ho, [address] [facility] [address]  Via In Basket    Dear Blaze Blood,      Thank you for referring Mr. Anshul Grewal to [address] [place] [address] for evaluation. My notes for this consultation are attached. If you have questions, please do not hesitate to call me. I look forward to following your patient along with you.       Sincerely,    John Carrillo, DO

## 2021-09-30 NOTE — PROGRESS NOTES
Anshul Grewal presents today for   Chief Complaint   Patient presents with    Follow-up     no concerns        Is someone accompanying this pt? no    Is the patient using any DME equipment during OV? no    Coordination of Care:  1. Have you been to the ER, urgent care clinic since your last visit? Hospitalized since your last visit? no    2. Have you seen or consulted any other health care providers outside of the [address] [place] [address] since your last visit? Include any pap smears or colon screening.  no

## 2022-01-17 ENCOUNTER — HOSPITAL ENCOUNTER (OUTPATIENT)
Dept: LAB | Age: 42
Discharge: HOME | End: 2022-01-17
Payer: COMMERCIAL

## 2022-01-17 ENCOUNTER — OFFICE VISIT (OUTPATIENT)
Dept: INTERNAL MEDICINE CLINIC | Age: 42
End: 2022-01-17
Payer: COMMERCIAL

## 2022-01-17 VITALS
BODY MASS INDEX: 33.04 KG/M2 | HEART RATE: 59 BPM | SYSTOLIC BLOOD PRESSURE: 125 MMHG | HEIGHT: 68 IN | WEIGHT: 218 LBS | OXYGEN SATURATION: 96 % | RESPIRATION RATE: 18 BRPM | DIASTOLIC BLOOD PRESSURE: 80 MMHG | TEMPERATURE: 97.2 F

## 2022-01-17 DIAGNOSIS — E78.5 DYSLIPIDEMIA: ICD-10-CM

## 2022-01-17 DIAGNOSIS — Z00.00 ROUTINE GENERAL MEDICAL EXAMINATION AT A HEALTH CARE FACILITY: Primary | ICD-10-CM

## 2022-01-17 DIAGNOSIS — Z00.00 ROUTINE GENERAL MEDICAL EXAMINATION AT A HEALTH CARE FACILITY: ICD-10-CM

## 2022-01-17 LAB
ALBUMIN SERPL-MCNC: 4 G/DL (ref 3.4–5)
ALBUMIN/GLOB SERPL: 1.4 {RATIO} (ref 0.8–1.7)
ALP SERPL-CCNC: 51 U/L (ref 45–117)
ALT SERPL-CCNC: 63 U/L (ref 16–61)
ANION GAP SERPL CALC-SCNC: 3 MMOL/L (ref 3–18)
APPEARANCE UR: CLEAR
AST SERPL-CCNC: 24 U/L (ref 10–38)
BILIRUB SERPL-MCNC: 0.6 MG/DL (ref 0.2–1)
BILIRUB UR QL: NEGATIVE
BUN SERPL-MCNC: 14 MG/DL (ref 7–18)
BUN/CREAT SERPL: 16 (ref 12–20)
CALCIUM SERPL-MCNC: 9.1 MG/DL (ref 8.5–10.1)
CHLORIDE SERPL-SCNC: 107 MMOL/L (ref 100–111)
CHOLEST SERPL-MCNC: 154 MG/DL
CO2 SERPL-SCNC: 30 MMOL/L (ref 21–32)
COLOR UR: YELLOW
CREAT SERPL-MCNC: 0.9 MG/DL (ref 0.6–1.3)
ERYTHROCYTE [DISTWIDTH] IN BLOOD BY AUTOMATED COUNT: 11.9 % (ref 11.6–14.5)
EST. AVERAGE GLUCOSE BLD GHB EST-MCNC: 105 MG/DL
GLOBULIN SER CALC-MCNC: 2.9 G/DL (ref 2–4)
GLUCOSE SERPL-MCNC: 79 MG/DL (ref 74–99)
GLUCOSE UR STRIP.AUTO-MCNC: NEGATIVE MG/DL
HBA1C MFR BLD: 5.3 % (ref 4.2–5.6)
HCT VFR BLD AUTO: 42.8 % (ref 36–48)
HDLC SERPL-MCNC: 48 MG/DL (ref 40–60)
HDLC SERPL: 3.2 {RATIO} (ref 0–5)
HGB BLD-MCNC: 14.4 G/DL (ref 13–16)
HGB UR QL STRIP: NEGATIVE
KETONES UR QL STRIP.AUTO: NEGATIVE MG/DL
LDLC SERPL CALC-MCNC: 90.4 MG/DL (ref 0–100)
LEUKOCYTE ESTERASE UR QL STRIP.AUTO: NEGATIVE
LIPID PROFILE,FLP: NORMAL
MCH RBC QN AUTO: 29.9 PG (ref 24–34)
MCHC RBC AUTO-ENTMCNC: 33.6 G/DL (ref 31–37)
MCV RBC AUTO: 89 FL (ref 78–100)
NITRITE UR QL STRIP.AUTO: NEGATIVE
NRBC # BLD: 0 K/UL (ref 0–0.01)
NRBC BLD-RTO: 0 PER 100 WBC
PH UR STRIP: 7 [PH] (ref 5–8)
PLATELET # BLD AUTO: 260 K/UL (ref 135–420)
PMV BLD AUTO: 10 FL (ref 9.2–11.8)
POTASSIUM SERPL-SCNC: 4.8 MMOL/L (ref 3.5–5.5)
PROT SERPL-MCNC: 6.9 G/DL (ref 6.4–8.2)
PROT UR STRIP-MCNC: NEGATIVE MG/DL
RBC # BLD AUTO: 4.81 M/UL (ref 4.35–5.65)
SODIUM SERPL-SCNC: 140 MMOL/L (ref 136–145)
SP GR UR REFRACTOMETRY: 1.02 (ref 1–1.03)
T4 FREE SERPL-MCNC: 0.9 NG/DL (ref 0.7–1.5)
TRIGL SERPL-MCNC: 78 MG/DL (ref ?–150)
TSH SERPL DL<=0.05 MIU/L-ACNC: 0.88 UIU/ML (ref 0.36–3.74)
UROBILINOGEN UR QL STRIP.AUTO: 0.2 EU/DL (ref 0.2–1)
VLDLC SERPL CALC-MCNC: 15.6 MG/DL
WBC # BLD AUTO: 4.9 K/UL (ref 4.6–13.2)

## 2022-01-17 PROCEDURE — 81003 URINALYSIS AUTO W/O SCOPE: CPT

## 2022-01-17 PROCEDURE — 83036 HEMOGLOBIN GLYCOSYLATED A1C: CPT

## 2022-01-17 PROCEDURE — 99396 PREV VISIT EST AGE 40-64: CPT | Performed by: NURSE PRACTITIONER

## 2022-01-17 PROCEDURE — 80053 COMPREHEN METABOLIC PANEL: CPT

## 2022-01-17 PROCEDURE — 84439 ASSAY OF FREE THYROXINE: CPT

## 2022-01-17 PROCEDURE — 85027 COMPLETE CBC AUTOMATED: CPT

## 2022-01-17 PROCEDURE — 80061 LIPID PANEL: CPT

## 2022-01-17 PROCEDURE — 36415 COLL VENOUS BLD VENIPUNCTURE: CPT

## 2022-01-17 RX ORDER — SIMVASTATIN 20 MG/1
20 TABLET, FILM COATED ORAL
Qty: 90 TABLET | Refills: 4 | Status: SHIPPED | OUTPATIENT
Start: 2022-01-17

## 2022-01-17 NOTE — PROGRESS NOTES
ROOM #  1  Identified pt with two pt identifiers(name and ). Reviewed record in preparation for visit and have obtained necessary documentation. Chief Complaint   Patient presents with   [address] preferred language for health care discussion is english/other. Is the patient using any DME equipment during OV? NO    Femi Griggs is due for:  Health Maintenance Due   Topic    Flu Vaccine (1)    COVID-19 Vaccine (3 - Booster for Martinez Peter series)   [address] Maintenance reviewed and discussed per provider  Please order/place referral if appropriate. 1. For patients aged 39-70: Has the patient had a colonoscopy? Yes - no Care Gap present   If the patient is female:    2. For patients aged 41-77: Has the patient had a mammogram within the past 2 years? No    3. For patients aged 21-65: Has the patient had a pap smear? No  Advance Directive:  1. Do you have an advance directive in place? Patient Reply: NO    2. If not, would you like material regarding how to put one in place? NO    Coordination of Care:  1. Have you been to the ER, urgent care clinic since your last visit? Hospitalized since your last visit? NO    2. Have you seen or consulted any other health care providers outside of the [address] [place] [address] since your last visit? Include any pap smears or colon screening. NO    Patient is accompanied by self I have received verbal consent from Femi Griggs to discuss any/all medical information while they are present in the room.     Learning Assessment:  Learning Assessment 1/15/2021 8/15/2016   PRIMARY LEARNER Patient Patient   HIGHEST LEVEL OF EDUCATION - PRIMARY LEARNER  4 YEARS OF COLLEGE 4 YEARS OF COLLEGE   BARRIERS PRIMARY LEARNER NONE NONE   CO-LEARNER CAREGIVER No No   PRIMARY LANGUAGE ENGLISH ENGLISH    NEED - No   LEARNER PREFERENCE PRIMARY DEMONSTRATION DEMONSTRATION   LEARNING SPECIAL TOPICS - no   ANSWERED BY patient  patinet   RELATIONSHIP SELF SELF   ASSESSMENT COMMENT - none     Depression Screening:  3 most recent [facility] Screens 9/30/2021 7/27/2021 1/15/2021 9/23/2020 1/29/2020 8/9/2019 3/18/2019   Little interest or pleasure in doing things Not at all Not at all Not at all Not at all Not at all Not at all Not at all   Feeling down, depressed, irritable, or hopeless Not at all Not at all Not at all Not at all Not at all Not at all Not at all   Total Score PHQ 2 0 0 0 0 0 0 0     Abuse Screening:  Abuse Screening Questionnaire 9/23/2020 8/9/2019   Do you ever feel afraid of your partner? N N   Are you in a relationship with someone who physically or mentally threatens you? N N   Is it safe for you to go home? Y Y     Fall Risk  Fall Risk Assessment, last 12 mths 9/23/2020   Able to walk? Yes   Fall in past 12 months?  No     Recent Travel Screening and Travel History documentation     Travel Screening     No screening recorded since 01/16/22 0000     Travel History   Travel since 12/17/21    No documented travel since 12/17/21

## 2022-01-17 NOTE — PROGRESS NOTES
[facility]   Primary Care Office Visit - Complete Physical    [place] is a 37 y.o. male presenting for annual physical.  : 1980        Assessment/Plan:     1. Routine general medical examination at a health care facility  Update labs for physical   - CBC W/O DIFF; Future  - HEMOGLOBIN A1C WITH EAG; Future  - TSH AND FREE T4; Future  - URINALYSIS W/ RFLX MICROSCOPIC; Future    2. Dyslipidemia  Continue Zocor nightly - update lipids and CMP  - simvastatin (ZOCOR) 20 mg tablet; Take 1 Tablet by mouth nightly. Dispense: 90 Tablet; Refill: 4  - METABOLIC PANEL, COMPREHENSIVE; Future  - LIPID PANEL; Future      Follow-up and Dispositions    · Return in about 1 year (around 2023) for CPE, CHOL. Orders & Diagnoses associated with This Encounter:         ICD-10-CM ICD-9-CM   1. Routine general medical examination at a health care facility  Z00.00 V70.0   2. Dyslipidemia  E78.5 272.4       Orders Placed This Encounter    CBC W/O DIFF     Standing Status:   Future     Standing Expiration Date:       METABOLIC PANEL, COMPREHENSIVE     Standing Status:   Future     Standing Expiration Date:   2023    HEMOGLOBIN A1C WITH EAG     Standing Status:   Future     Standing Expiration Date:   2023    LIPID PANEL     Standing Status:   Future     Standing Expiration Date:   2023    TSH AND FREE T4     Standing Status:   Future     Standing Expiration Date:   2023    URINALYSIS W/ RFLX MICROSCOPIC     Standing Status:   Future     Standing Expiration Date:   2023    simvastatin (ZOCOR) 20 mg tablet     Sig: Take 1 Tablet by mouth nightly. Dispense:  90 Tablet     Refill:  4           Management plan & patient instructions reviewed with [place], who voiced understanding. This document may have been created with the aid of dictation software. Text may contain errors, particularly phonetic errors.       Matilda Pacheco NP-C     2022 Subjective   History:   Darrell Jimenez is a 37 y.o. male presenting for an annual physical.    HLD- taking Zocor 20 mg   Lab Results   Component Value Date/Time    Cholesterol, total 168 01/15/2021 09:02 AM    HDL Cholesterol 63 (H) 01/15/2021 09:02 AM    LDL, calculated 91.6 01/15/2021 09:02 AM    VLDL, calculated 13.4 01/15/2021 09:02 AM    Triglyceride 67 01/15/2021 09:02 AM    CHOL/HDL Ratio 2.7 01/15/2021 09:02 AM       Past Medical History:   Diagnosis Date    Elevated LDL cholesterol level     Sleep apnea, obstructive     with CPAP    Tinea corporis      History reviewed. No pertinent surgical history. reports that he has never smoked. He has never used smokeless tobacco. He reports current alcohol use of about 2.0 standard drinks of alcohol per week. He reports that he does not use drugs. Social History     Social History Narrative    Not on file     Social History     Tobacco Use   Smoking Status Never Smoker   Smokeless Tobacco Never Used     Family History   Problem Relation Age of Onset    No Known Problems Mother     High Cholesterol Father      No Known Allergies    Problem List:      Patient Active Problem List    Diagnosis    JANINA on CPAP    Dyslipidemia    Mixed hyperlipidemia    Seasonal allergic rhinitis due to pollen    BMI 34.0-34.9,adult       Medications:     Current Outpatient Medications   Medication Sig    simvastatin (ZOCOR) 20 mg tablet Take 1 Tablet by mouth nightly.  cetirizine (ZYRTEC) 10 mg tablet Take 10 mg by mouth daily.  cpap machine kit by Does Not Apply route nightly. M.E.D. (Patient not taking: Reported on 1/17/2022)     No current facility-administered medications for this visit. Review of Systems:     Review of Systems   Constitutional: Negative for chills, fever and malaise/fatigue. Eyes: Negative for blurred vision and double vision. Respiratory: Negative for cough, shortness of breath and wheezing.     Cardiovascular: Negative for chest pain, palpitations and leg swelling. Gastrointestinal: Negative for abdominal pain, constipation, diarrhea, heartburn, nausea and vomiting. Genitourinary: Negative for frequency and urgency. Skin: Positive for rash. Negative for itching. Neurological: Negative for dizziness and headaches. Endo/Heme/Allergies: Negative for polydipsia. Psychiatric/Behavioral: Negative for depression. The patient is not nervous/anxious. Objective   Physical Assessment:   VS:    Visit Vitals  /80 (BP 1 Location: Right arm, BP Patient Position: Sitting)   Pulse (!) 59   Temp 97.2 °F (36.2 °C) (Temporal)   Resp 18   Ht 5' 8\" (1.727 m)   Wt 218 lb (98.9 kg)   SpO2 96%   BMI 33.15 kg/m²       Physical Exam  Vitals and nursing note reviewed. Constitutional:       General: He is not in acute distress. Appearance: He is obese. He is not ill-appearing or toxic-appearing. HENT:      Head: Normocephalic and atraumatic. Right Ear: Tympanic membrane, ear canal and external ear normal.      Left Ear: Tympanic membrane, ear canal and external ear normal.      Nose: Nose normal. No congestion. Comments: Scab in left nostril      Mouth/Throat:      Mouth: Mucous membranes are moist.      Pharynx: Oropharynx is clear. Eyes:      General: No scleral icterus. Right eye: No discharge. Left eye: No discharge. Extraocular Movements: Extraocular movements intact. Conjunctiva/sclera: Conjunctivae normal.      Pupils: Pupils are equal, round, and reactive to light. Cardiovascular:      Rate and Rhythm: Normal rate and regular rhythm. Pulses: Normal pulses. Heart sounds: Normal heart sounds. Pulmonary:      Effort: Pulmonary effort is normal.      Breath sounds: Normal breath sounds. No wheezing. Abdominal:      General: Abdomen is flat. Bowel sounds are normal. There is no distension. Palpations: Abdomen is soft. There is no mass. Tenderness:  There is no abdominal tenderness. There is no guarding or rebound. Hernia: No hernia is present. Musculoskeletal:         General: Normal range of motion. Cervical back: Normal range of motion. Right lower leg: No edema. Left lower leg: No edema. Lymphadenopathy:      Cervical: No cervical adenopathy. Skin:     General: Skin is warm and dry. Neurological:      General: No focal deficit present. Mental Status: He is alert and oriented to person, place, and time. Mental status is at baseline. Psychiatric:         Mood and Affect: Mood normal.         Behavior: Behavior normal.         Thought Content: Thought content normal.         Judgment: Judgment normal.         Records Review:           Recent Labs & Imaging:     No results found for this or any previous visit (from the past 12 hour(s)).

## 2022-01-18 NOTE — PROGRESS NOTES
Results reviewed. Please call patient with results. Everything is normal except liver enzymes are sightly elevated but trending down - recommend abstaining from alcohol.

## 2022-01-21 ENCOUNTER — PATIENT MESSAGE (OUTPATIENT)
Dept: INTERNAL MEDICINE CLINIC | Age: 42
End: 2022-01-21

## 2022-01-21 NOTE — PROGRESS NOTES
Called pt and left message. Call back number left. The call was to inform pt of lab results. Websupportt sent.

## 2022-03-18 PROBLEM — E78.2 MIXED HYPERLIPIDEMIA: Status: ACTIVE | Noted: 2018-12-27

## 2022-03-19 PROBLEM — J30.1 SEASONAL ALLERGIC RHINITIS DUE TO POLLEN: Status: ACTIVE | Noted: 2018-12-27

## 2022-03-19 PROBLEM — G47.33 OSA ON CPAP: Status: ACTIVE | Noted: 2021-01-15

## 2022-03-19 PROBLEM — E78.5 DYSLIPIDEMIA: Status: ACTIVE | Noted: 2021-01-15

## 2022-03-19 PROBLEM — Z99.89 OSA ON CPAP: Status: ACTIVE | Noted: 2021-01-15

## 2022-11-28 ENCOUNTER — TELEPHONE (OUTPATIENT)
Dept: PULMONOLOGY | Age: 42
End: 2022-11-28

## 2022-11-28 NOTE — TELEPHONE ENCOUNTER
Pt would like a new script sent in to his DME company. Pt does have an appt with us on 01/11 at 8:30. Please call [phone] for further information.

## 2022-11-30 DIAGNOSIS — G47.33 OSA (OBSTRUCTIVE SLEEP APNEA): Primary | ICD-10-CM

## 2023-01-11 ENCOUNTER — OFFICE VISIT (OUTPATIENT)
Dept: PULMONOLOGY | Age: 43
End: 2023-01-11
Payer: COMMERCIAL

## 2023-01-11 VITALS
DIASTOLIC BLOOD PRESSURE: 85 MMHG | BODY MASS INDEX: 33.65 KG/M2 | SYSTOLIC BLOOD PRESSURE: 129 MMHG | HEART RATE: 72 BPM | HEIGHT: 68 IN | RESPIRATION RATE: 14 BRPM | OXYGEN SATURATION: 96 % | WEIGHT: 222 LBS | TEMPERATURE: 97.6 F

## 2023-01-11 DIAGNOSIS — F45.8 BRUXISM: ICD-10-CM

## 2023-01-11 DIAGNOSIS — E66.9 OBESITY (BMI 30-39.9): ICD-10-CM

## 2023-01-11 DIAGNOSIS — Z99.89 OSA ON CPAP: Primary | ICD-10-CM

## 2023-01-11 DIAGNOSIS — E78.5 DYSLIPIDEMIA: ICD-10-CM

## 2023-01-11 DIAGNOSIS — J30.1 SEASONAL ALLERGIC RHINITIS DUE TO POLLEN: ICD-10-CM

## 2023-01-11 DIAGNOSIS — G47.33 OSA ON CPAP: Primary | ICD-10-CM

## 2023-01-11 PROCEDURE — 99213 OFFICE O/P EST LOW 20 MIN: CPT | Performed by: INTERNAL MEDICINE

## 2023-01-11 NOTE — PROGRESS NOTES
Pam Laura presents today for   Chief Complaint   Patient presents with    Follow-up    Sleep Apnea    CPAP       Is someone accompanying this pt? no    Is the patient using any DME equipment during OV? Yes - CPAP     -[employer]    Depression Screening:  3 most recent PHQ Screens 1/11/2023   Little interest or pleasure in doing things Not at all   Feeling down, depressed, irritable, or hopeless Not at all   Total Score PHQ 2 0       [place] Sleepiness Scale:  [place] Sleepiness Scale  1/11/2023   Sitting and Reading 0   Watching TV 1   Sitting, inactive in a public place (e.g. a movie theater or meeting) 0   As a passenger in a car for an hour, without a break 1   Lying down to rest in the afternoon, when circumstances permit 3   Sitting and talking to someone 0   Sitting quietly after lunch without alcohol 0   In a car, while stopped for a few minutes in traffic 0   [place] Sleepiness Score 5         Coordination of Care:  1. Have you been to the ER, urgent care clinic since your last visit? Hospitalized since your last visit? No    2. Have you seen or consulted any other health care providers outside of the [address] [place] [address] since your last visit? Include any pap smears or colon screening. N/A    Medication list has been updated according to patient.

## 2023-01-11 NOTE — PATIENT INSTRUCTIONS
Please call our clinic back at [phone] or send a message on [employer] if you have any questions or concerns or if you are experiencing any of the following: You have not received a follow up appointment within 30 days prior the recommended follow up time. If you are not tolerating treatment plan and/or not able to obtain equipment or prescribed medication(s). if you are experiencing any difficulties with the [employer]  (DME) Company you may be using or is assigned to you. Two weeks have passed and you have not received an appointment for a scheduled procedure. Two weeks have passed since you underwent a test and/or procedure and you have not received your results. If you are using a CPAP/BIPAP, or Home Ventilator Device- Please note the following. Currently, many DMEs are experiencing supply chain difficulties and orders for equipment may be back logged several weeks to months. Your  Durable Medical Equipment (DME ) company is supposed to provide you with replacement filters, tubing and masks. You can either call your DME when you need new supplies or you can arrange for an automatic shipment schedule. Your need to be seen by our office at lat minimum of every 12 months in order to renew the prescription for these supplies. Please make note of who your DME company is and their phone number. Please make sure that you clean your mask and hosing on a regular basis. Your DME can provide you with additional information regarding proper care and cleaning of your device           Safety is strongly encouraged. You should not drive if sleepy, tired, distracted and/or  fatigued. If a procedure or imaging study has been ordered for your by this clinic please call the [facility] at [place] or Michael at  [address] [place] [address] [place] and blood work do not require appointments.   They are considered \"Walk-In\" services and can be obtained at either [facility] or [facility].     Blood work is performed at the Laboratory (Lab) from 08:00am - 04:00pm      -Thank you

## 2023-01-11 NOTE — PROGRESS NOTES
[address] [facility]  Pulmonary, Critical Care, and Sleep Medicine    Office Progress Note-Follow Up      Primary Care Physician: Kimo Henry NP     Reason for Visit:  Follow Up JANINA- PAP Therapy    Assessment:  Obstructive Sleep Apnea (JANINA)- Moderate (AHI: 16.9)- Currently receiving clinical benefit from therapy and have very good compliance. He is using both a stationary device and travel device. Will increase CPAP low to 8 as AHI is still a little high. We discussed recall issue and that the patient will continue to use his PAP devices. Will try to replace but if not will use of a biofilter may not afford any clinical benefit but it may at least stop some particulate matter  Hyperlipidemia  Bruxism- Has oral guard- working well for patient  Obesity: Body mass index is 33.75 kg/m². Seasonal Allergies- Zyrtec, Has had nosebleeds with nasal steroid- no issue at this time  Alcohol use: on weekends  Report of recurrent nosebleeds- currently well controlled. Plan:    Change to APAP 9.5/15 Flex 2- changes made on cloud to [facility] today in clinic  Patient awaiting replacement of travel PAP device  Renew supplies  PAP hygiene reviewed- patient not using UV/Ozone cleaning device  Continue Zyrtec PRN   Continue with oral guard and follow up with dentist as directed. Healthy lifestyle changes to include weight loss and exercise discussed. Healthy sleep habits were reviewed and encouraged.  and workplace safety reviewed and discussed as appropriate. Drowsy and/or inattentive driving should be avoided. - Patient reports this is not an issue at this time  COVID-19 precautions per CDC guidelines  Follow-up in 12 months, sooner should new symptoms or problems arise. Follow up with Primary Care Provider (PCP) as directed and for  BP, routine health care maintenance.          History of Present Illness: Mr. Valladares  is a 40 y.o. male patient who presents today for routine follow up for JANINA and PAP therapy        Today the following is noted and/or reported: Last Clinic Visit: 9/30/21    Patient has [place] travel and home device- Both were under recall. His home device was replaced with a refurbished device. He is still awaiting replacement of his travel device    He is still very happy with therapy. 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 [place]  Device: 1) Refurbished DS (Post Recall) Home Device  2) Travel Unit- still under recall ,awaiting replacement  Mask: Nasal Pocket Dreamwear  Date >4 hr use % Time  (Total Time%) AHI PAP Rx Pressure @ 90% Average Use Time Leak-large Notes   7/10-8/8/19 60 (63.3) 6.8 APAP 5/15 9.5 06:00:07 00:01:38 Has travel unit   6/12-/9/9/20 94.4 (96.7) 6.7 APAP 5/15 9.4 06:36:13 00:00:20    7/1/21-9/28/21 86.7 (86.7) 5.0 APAP 7/13 9.7 06:58:54 00:02:21    10/13/22-1/10/23 78.9 (78.9) 4.4 APAP 7/15 Flex:2 9.5 07:17:20 00:00:22                          Father: JANINA - uses CPAP    Stop Bang 1/11/2023 9/23/2020 3/18/2019   Does the patient snore loudly (louder than talking or loud enough to be heard through closed doors)?  0 0 0   Does the patient often feel tired, fatigued, or sleepy during the daytime, even after a \"good\" night's sleep? 0 0 1   Has anyone ever observed the patient stop breathing during their sleep?  0 0 0   Does the patient have or are they being treated for high blood pressure? 0 0 0   Is the patient's BMI greater than 35? 0 0 0   Is your neck circumference greater than 17 inches (Male) or 16 inches (Female)? 1 1 1   Is the patient older than 50? 0 0 0   Is the patient male?  1 1 1   JANINA Score 2 2 3       3 most recent PHQ Screens 1/11/2023   Little interest or pleasure in doing things Not at all   Feeling down, depressed, irritable, or hopeless Not at all   Total Score PHQ 2 0       [place] Scale 1/11/2023 9/30/2021 9/23/2020 8/9/2019 3/18/2019   Sitting and Reading 0 0 0 0 1   Watching TV 1 1 0 1 1   Sitting, inactive in a public place (e.g. a movie theater or meeting) 0 0 0 0 0   As a passenger in a car for an hour, without a break 1 2 1 3 2   Lying down to rest in the afternoon, when circumstances permit 3 2 2 3 3   Sitting and talking to someone 0 0 0 0 0   Sitting quietly after lunch without alcohol 0 0 0 1 2   In a car, while stopped for a few minutes in traffic 0 0 0 0 0   [place] Sleepiness Score 5 5 3 8 9        Immunization History   Administered Date(s) Administered    Anthrax Vaccine 08/02/2010, 12/22/2010    COVID-19, PFIZER PURPLE top, DILUTE for use, (age 15 y+), IM, 30mcg/0.3mL 03/05/2021, 03/25/2021    Hep A and Hep B Vaccine 03/09/2010, 05/10/2010, 12/22/2010    IPV 03/11/2010    Influenza Nasal Vaccine 12/22/2010, 10/09/2012, 10/10/2013    Influenza Vaccine 12/01/2019, 11/10/2022    Influenza Vaccine (Whole Virus) 03/09/2010, 01/19/2012    Influenza, FLUARIX, FLULAVAL, FLUZONE (age 10 mo+) AND AFLURIA, (age 1 y+), PF, 0.5mL 10/13/2016, 12/27/2017, 11/05/2018, 10/31/2019, 09/28/2020    Influenza, FLUMIST, (age 2-51 y), Live, Intranasal 11/12/2014, 09/28/2015    MMR 03/09/2010    Novel Influenza-H1N1-09, Injectable 03/11/2010    SMALLPOX, [id number] 08/05/2010    TB Skin Test (PPD) Intradermal 03/09/2010    Tdap 03/11/2010, 01/29/2020    Typhoid Vi Capsular Polysaccharide Vaccine 03/09/2010         Past Medical History:  Past Medical History:   Diagnosis Date    Elevated LDL cholesterol level     Sleep apnea, obstructive     with CPAP    Tinea corporis        Past Surgical History:  History reviewed. No pertinent surgical history. Family History:  Family History   Problem Relation Age of Onset    No Known Problems Mother     High Cholesterol Father        Social History:    Caffeine Amount Time of last Intake Comments   Coffee 2 cups/am     Soda None     Tea None     Energy Drinks None     Over- the - counter stimulant pills None     Other Substances      Alcohol 8/ weekend  No drinking during the week   Tobacco None     Drugs None         Medications:  Current Outpatient Medications on File Prior to Visit   Medication Sig Dispense Refill    simvastatin (ZOCOR) 20 mg tablet Take 1 Tablet by mouth nightly. 90 Tablet 4    cpap machine kit by Does Not Apply route nightly. M.E.D.      cetirizine (ZYRTEC) 10 mg tablet Take 10 mg by mouth daily. No current facility-administered medications on file prior to visit. Allergy:  No Known Allergies    Review of Systems  General ROS: negative for - chills, fatigue, fever, hot flashes, malaise, night sweats, weight gain or weight loss  ENT ROS: negative for - headaches, hearing change, nasal congestion, nasal discharge, nasal polyps, oral lesions, sinus pain, sneezing, sore throat, tinnitus, vertigo, visual changes or vocal changes, + for h/o  recurrent nose bleeds - worse in the winter time and with nasal steroid- currently not an issue. + seasonal allergies. - not an issue at this time   Hematological and Lymphatic ROS: negative for - bleeding problems, blood clots, bruising, jaundice, pallor or swollen lymph nodes  Endocrine ROS: negative for - polydipsia/polyuria, skin changes, temperature intolerance or unexpected weight changes  Respiratory ROS: no cough, shortness of breath, or wheezing  Cardiovascular ROS: no chest pain or dyspnea on exertion  Gastrointestinal ROS: no abdominal pain, change in bowel habits, or black or bloody stools  Genito-Urinary ROS: no dysuria, trouble voiding, or hematuria  Musculoskeletal ROS: negative  Neurological ROS: no TIA or stroke symptoms  Dermatological ROS: negative for - pruritus, rash or skin lesion changes   Psychological ROS: negative   Otherwise negative and per HPI      Physical Exam:  Blood pressure 129/85, pulse 72, temperature 97.6 °F (36.4 °C), temperature source Temporal, resp. rate 14, height 5' 8\" (1.727 m), weight 100.7 kg (222 lb), SpO2 96 %. on RA , Body mass index is 33.75 kg/m².      General:No distress, acyanotic, appears stated age, cooperative, pleasant, + full beard present  HEENT: Head size proportionally small and narrow , PERRL, EOMI, throat without erythema or exudate, Tongue with  dental indention on tongue, Mallampati's score 3+, Uvula- midline, + mild retrognathia, Tonsils- 1,   Neck: Supple, No JVD  Chest: Grossly normal  Abdomen: Soft    Extremity: Negative for edema, cyanosis or clubbing  Skin: Skin color, texture, turgor normal. No rashes or lesions,   Neurological: CN 2-12 grossly intact, normal muscle tone    Data Reviewed:  CBC:   Lab Results   Component Value Date/Time    WBC 4.9 01/17/2022 10:49 AM    HGB 14.4 01/17/2022 10:49 AM    HCT 42.8 01/17/2022 10:49 AM    PLATELET 494 67/57/2516 10:49 AM    MCV 89.0 01/17/2022 10:49 AM       BMP:   Lab Results   Component Value Date/Time    Sodium 140 01/17/2022 10:49 AM    Potassium 4.8 01/17/2022 10:49 AM    Chloride 107 01/17/2022 10:49 AM    CO2 30 01/17/2022 10:49 AM    Anion gap 3 01/17/2022 10:49 AM    Glucose 79 01/17/2022 10:49 AM    BUN 14 01/17/2022 10:49 AM    Creatinine 0.90 01/17/2022 10:49 AM    BUN/Creatinine ratio 16 01/17/2022 10:49 AM    GFR est AA >60 01/17/2022 10:49 AM    GFR est non-AA >60 01/17/2022 10:49 AM    Calcium 9.1 01/17/2022 10:49 AM        TSH:  Lab Results   Component Value Date/Time    TSH 0.88 01/17/2022 10:49 AM    TSH 1.36 01/15/2021 09:02 AM    TSH 1.13 01/29/2020 08:59 AM    TSH 1.50 12/27/2018 09:21 AM         Historical Sleep Testing Data:    - HST: 4/9/19: AHI: 16.9, ASHTYN: 17.5, SpO2 rikki: 80%        Nico Thompson DO, [facility]  P[facility], Sleep and Critical Care Medicine

## 2023-01-26 ENCOUNTER — OFFICE VISIT (OUTPATIENT)
Dept: INTERNAL MEDICINE CLINIC | Age: 43
End: 2023-01-26
Payer: COMMERCIAL

## 2023-01-26 ENCOUNTER — HOSPITAL ENCOUNTER (OUTPATIENT)
Dept: LAB | Age: 43
Discharge: HOME | End: 2023-01-26
Payer: COMMERCIAL

## 2023-01-26 VITALS
HEIGHT: 68 IN | BODY MASS INDEX: 33.49 KG/M2 | OXYGEN SATURATION: 99 % | HEART RATE: 58 BPM | WEIGHT: 221 LBS | SYSTOLIC BLOOD PRESSURE: 128 MMHG | DIASTOLIC BLOOD PRESSURE: 85 MMHG | RESPIRATION RATE: 18 BRPM | TEMPERATURE: 96.4 F

## 2023-01-26 DIAGNOSIS — E66.9 OBESITY (BMI 30.0-34.9): ICD-10-CM

## 2023-01-26 DIAGNOSIS — E78.5 DYSLIPIDEMIA: ICD-10-CM

## 2023-01-26 DIAGNOSIS — Z00.00 GENERAL MEDICAL EXAM: Primary | ICD-10-CM

## 2023-01-26 DIAGNOSIS — Z00.00 GENERAL MEDICAL EXAM: ICD-10-CM

## 2023-01-26 LAB
ALBUMIN SERPL-MCNC: 4.4 G/DL (ref 3.4–5)
ALBUMIN/GLOB SERPL: 1.3 (ref 0.8–1.7)
ALP SERPL-CCNC: 60 U/L (ref 45–117)
ALT SERPL-CCNC: 61 U/L (ref 16–61)
ANION GAP SERPL CALC-SCNC: 4 MMOL/L (ref 3–18)
APPEARANCE UR: CLEAR
AST SERPL-CCNC: 29 U/L (ref 10–38)
BILIRUB SERPL-MCNC: 0.4 MG/DL (ref 0.2–1)
BILIRUB UR QL: NEGATIVE
BUN SERPL-MCNC: 16 MG/DL (ref 7–18)
BUN/CREAT SERPL: 15 (ref 12–20)
CALCIUM SERPL-MCNC: 9.3 MG/DL (ref 8.5–10.1)
CHLORIDE SERPL-SCNC: 103 MMOL/L (ref 100–111)
CHOLEST SERPL-MCNC: 159 MG/DL
CO2 SERPL-SCNC: 31 MMOL/L (ref 21–32)
COLOR UR: YELLOW
CREAT SERPL-MCNC: 1.08 MG/DL (ref 0.6–1.3)
ERYTHROCYTE [DISTWIDTH] IN BLOOD BY AUTOMATED COUNT: 12.1 % (ref 11.6–14.5)
EST. AVERAGE GLUCOSE BLD GHB EST-MCNC: 103 MG/DL
GLOBULIN SER CALC-MCNC: 3.4 G/DL (ref 2–4)
GLUCOSE SERPL-MCNC: 82 MG/DL (ref 74–99)
GLUCOSE UR STRIP.AUTO-MCNC: NEGATIVE MG/DL
HBA1C MFR BLD: 5.2 % (ref 4.2–5.6)
HCT VFR BLD AUTO: 45.4 % (ref 36–48)
HDLC SERPL-MCNC: 53 MG/DL (ref 40–60)
HDLC SERPL: 3 (ref 0–5)
HGB BLD-MCNC: 15.5 G/DL (ref 13–16)
HGB UR QL STRIP: NEGATIVE
KETONES UR QL STRIP.AUTO: NEGATIVE MG/DL
LDLC SERPL CALC-MCNC: 85.8 MG/DL (ref 0–100)
LEUKOCYTE ESTERASE UR QL STRIP.AUTO: NEGATIVE
LIPID PROFILE,FLP: NORMAL
MCH RBC QN AUTO: 30.3 PG (ref 24–34)
MCHC RBC AUTO-ENTMCNC: 34.1 G/DL (ref 31–37)
MCV RBC AUTO: 88.8 FL (ref 78–100)
NITRITE UR QL STRIP.AUTO: NEGATIVE
NRBC # BLD: 0 K/UL (ref 0–0.01)
NRBC BLD-RTO: 0 PER 100 WBC
PH UR STRIP: 6.5 (ref 5–8)
PLATELET # BLD AUTO: 289 K/UL (ref 135–420)
PMV BLD AUTO: 9.7 FL (ref 9.2–11.8)
POTASSIUM SERPL-SCNC: 4.4 MMOL/L (ref 3.5–5.5)
PROT SERPL-MCNC: 7.8 G/DL (ref 6.4–8.2)
PROT UR STRIP-MCNC: NEGATIVE MG/DL
RBC # BLD AUTO: 5.11 M/UL (ref 4.35–5.65)
SODIUM SERPL-SCNC: 138 MMOL/L (ref 136–145)
SP GR UR REFRACTOMETRY: 1.01 (ref 1–1.03)
T4 FREE SERPL-MCNC: 1 NG/DL (ref 0.7–1.5)
TRIGL SERPL-MCNC: 101 MG/DL (ref ?–150)
TSH SERPL DL<=0.05 MIU/L-ACNC: 1.01 UIU/ML (ref 0.36–3.74)
UROBILINOGEN UR QL STRIP.AUTO: 0.2 EU/DL (ref 0.2–1)
VLDLC SERPL CALC-MCNC: 20.2 MG/DL
WBC # BLD AUTO: 7.1 K/UL (ref 4.6–13.2)

## 2023-01-26 PROCEDURE — 80053 COMPREHEN METABOLIC PANEL: CPT

## 2023-01-26 PROCEDURE — 99396 PREV VISIT EST AGE 40-64: CPT | Performed by: NURSE PRACTITIONER

## 2023-01-26 PROCEDURE — 84443 ASSAY THYROID STIM HORMONE: CPT

## 2023-01-26 PROCEDURE — 85027 COMPLETE CBC AUTOMATED: CPT

## 2023-01-26 PROCEDURE — 83036 HEMOGLOBIN GLYCOSYLATED A1C: CPT

## 2023-01-26 PROCEDURE — 81003 URINALYSIS AUTO W/O SCOPE: CPT

## 2023-01-26 PROCEDURE — 80061 LIPID PANEL: CPT

## 2023-01-26 PROCEDURE — 36415 COLL VENOUS BLD VENIPUNCTURE: CPT

## 2023-01-26 PROCEDURE — 84439 ASSAY OF FREE THYROXINE: CPT

## 2023-01-26 RX ORDER — SIMVASTATIN 20 MG/1
20 TABLET, FILM COATED ORAL
Qty: 90 TABLET | Refills: 0 | Status: SHIPPED | OUTPATIENT
Start: 2023-01-26

## 2023-01-26 NOTE — PROGRESS NOTES
[address] [facility]  Primary Care Office Visit - Complete Physical    Dutch Ovalle is a 40 y.o. male presenting for annual physical.  : 1980        Assessment/Plan:     1. General medical exam    - CBC W/O DIFF; Future  - T4, FREE; Future  - TSH 3RD GENERATION; Future  - HEMOGLOBIN A1C WITH EAG; Future  - URINALYSIS W/ RFLX MICROSCOPIC; Future    2. Dyslipidemia    - simvastatin (ZOCOR) 20 mg tablet; Take 1 Tablet by mouth nightly. Dispense: 90 Tablet; Refill: 0  - METABOLIC PANEL, COMPREHENSIVE; Future  - LIPID PANEL; Future    3. Obesity (BMI 30.0-34.9)    - HEMOGLOBIN A1C WITH EAG; Future    Dyslipidemia- continue medication as prescribed. Obesity-Continue to work on diet and exercise      Follow-up and Dispositions    Return in about 4 months (around 2023) for CHOL. Orders & Diagnoses associated with This Encounter:         ICD-10-CM ICD-9-CM   1. General medical exam  Z00.00 V70.9   2. Dyslipidemia  E78.5 272.4   3. Obesity (BMI 30.0-34. 9)  E66.9 278.00       Orders Placed This Encounter    CBC W/O DIFF     Standing Status:   Future     Standing Expiration Date:       METABOLIC PANEL, COMPREHENSIVE     Standing Status:   Future     Standing Expiration Date:   2024    LIPID PANEL     Standing Status:   Future     Standing Expiration Date:   2024    T4, FREE     Standing Status:   Future     Standing Expiration Date:   2023    TSH 3RD GENERATION     Standing Status:   Future     Standing Expiration Date:   2024    HEMOGLOBIN A1C WITH EAG     Standing Status:   Future     Standing Expiration Date:   2024    URINALYSIS W/ RFLX MICROSCOPIC     Standing Status:   Future     Standing Expiration Date:   2024    simvastatin (ZOCOR) 20 mg tablet     Sig: Take 1 Tablet by mouth nightly. Dispense:  90 Tablet     Refill:  0             Management plan & patient instructions reviewed with Dutch Ovalle, who voiced understanding.     This document may have been created with the aid of dictation software. Text may contain errors, particularly phonetic errors. Belinda Hernandez NP - C  Family Medicine   1/26/2023         Subjective   History:   Oxana Orozco is a 40 y.o. male presenting for an annual physical.    HLD - Zocor 20 mg nightly     Past Medical History:   Diagnosis Date    Elevated LDL cholesterol level     Sleep apnea, obstructive     with CPAP    Tinea corporis      History reviewed. No pertinent surgical history. reports that he has never smoked. He has never used smokeless tobacco. He reports current alcohol use of about 2.0 standard drinks per week. He reports that he does not use drugs. Social History     Social History Narrative    Not on file     Social History     Tobacco Use   Smoking Status Never   Smokeless Tobacco Never     Family History   Problem Relation Age of Onset    No Known Problems Mother     High Cholesterol Father      No Known Allergies    Problem List:      Patient Active Problem List    Diagnosis    JANINA on CPAP    Dyslipidemia    Mixed hyperlipidemia    Seasonal allergic rhinitis due to pollen    BMI 34.0-34.9,adult       Medications:     Current Outpatient Medications   Medication Sig    simvastatin (ZOCOR) 20 mg tablet Take 1 Tablet by mouth nightly. cpap machine kit by Does Not Apply route nightly. M.E.D.    cetirizine (ZYRTEC) 10 mg tablet Take 10 mg by mouth daily. No current facility-administered medications for this visit. Review of Systems:     Review of Systems   Constitutional:  Negative for chills, fever and malaise/fatigue. HENT:  Positive for congestion. Eyes:  Negative for blurred vision and double vision. Respiratory:  Negative for cough, shortness of breath and wheezing. Cardiovascular:  Negative for chest pain and palpitations. Neurological:  Negative for dizziness and headaches.           Objective   Physical Assessment:   VS:  Visit Vitals  /85 (BP 1 Location: Right arm, BP Patient Position: Sitting)   Pulse (!) 58   Temp (!) 96.4 °F (35.8 °C) (Temporal)   Resp 18   Ht 5' 8\" (1.727 m)   Wt 221 lb (100.2 kg)   SpO2 99%   BMI 33.60 kg/m²         Physical Exam  Vitals and nursing note reviewed. Constitutional:       Appearance: Normal appearance. He is obese. HENT:      Head: Normocephalic and atraumatic. Nose:      Comments: MASK     Mouth/Throat:      Comments: MASK  Eyes:      Conjunctiva/sclera: Conjunctivae normal.   Cardiovascular:      Rate and Rhythm: Regular rhythm. Bradycardia present. Pulses: Normal pulses. Heart sounds: Normal heart sounds. No murmur heard. Pulmonary:      Effort: Pulmonary effort is normal. No respiratory distress. Breath sounds: Normal breath sounds. No wheezing. Musculoskeletal:      Cervical back: Normal range of motion and neck supple. Lymphadenopathy:      Cervical: No cervical adenopathy. Skin:     General: Skin is warm and dry. Neurological:      General: No focal deficit present. Mental Status: He is alert and oriented to person, place, and time. Mental status is at baseline. Psychiatric:         Mood and Affect: Mood normal.         Thought Content: Thought content normal.         Judgment: Judgment normal.       Records Review:           Recent Labs & Imaging:     No results found for this or any previous visit (from the past 12 hour(s)).

## 2023-01-26 NOTE — PROGRESS NOTES
ROOM # 1  Identified pt with two pt identifiers(name and ). Reviewed record in preparation for visit and have obtained necessary documentation. Chief Complaint   Patient presents with    Physical      Kellie Olsen preferred language for health care discussion is english/other. Is the patient using any DME equipment during OV? NO    Kellie Olsen is due for:  Health Maintenance Due   Topic    COVID-19 Vaccine (3 - Booster for [place] series)    Lipid Screen      Health Maintenance reviewed and discussed per provider  Please order/place referral if appropriate. 1. For patients aged 39-70: Has the patient had a colonoscopy? Yes - no Care Gap present   If the patient is female:    2. For patients aged 41-77: Has the patient had a mammogram within the past 2 years? No    3. For patients aged 21-65: Has the patient had a pap smear? No  Advance Directive:  1. Do you have an advance directive in place? Patient Reply: NO    2. If not, would you like material regarding how to put one in place? NO    Coordination of Care:  1. Have you been to the ER, urgent care clinic since your last visit? Hospitalized since your last visit? NO    2. Have you seen or consulted any other health care providers outside of the [address] [place] [address] since your last visit? Include any pap smears or colon screening. NO    Patient is accompanied by self I have received verbal consent from Kellie Olsen to discuss any/all medical information while they are present in the room.     Learning Assessment:  Learning Assessment 1/15/2021 8/15/2016   PRIMARY LEARNER Patient Patient   HIGHEST LEVEL OF EDUCATION - PRIMARY LEARNER  4 YEARS OF COLLEGE 4 YEARS OF COLLEGE   BARRIERS PRIMARY LEARNER NONE NONE   CO-LEARNER CAREGIVER No No   PRIMARY LANGUAGE ENGLISH ENGLISH    NEED - No   LEARNER PREFERENCE PRIMARY DEMONSTRATION DEMONSTRATION   LEARNING SPECIAL TOPICS - no   ANSWERED BY patient  patinet   RELATIONSHIP SELF SELF   ASSESSMENT COMMENT - none     Depression Screening:  3 most recent [facility] 36 Screens 1/26/2023 1/11/2023 9/30/2021 7/27/2021 1/15/2021 9/23/2020 1/29/2020   Little interest or pleasure in doing things Not at all Not at all Not at all Not at all Not at all Not at all Not at all   Feeling down, depressed, irritable, or hopeless Not at all Not at all Not at all Not at all Not at all Not at all Not at all   Total Score PHQ 2 0 0 0 0 0 0 0     Abuse Screening:  Abuse Screening Questionnaire 9/23/2020 8/9/2019   Do you ever feel afraid of your partner? N N   Are you in a relationship with someone who physically or mentally threatens you? N N   Is it safe for you to go home? Y Y     Fall Risk  Fall Risk Assessment, last 12 mths 9/23/2020   Able to walk? Yes   Fall in past 12 months?  No     Recent Travel Screening and Travel History documentation     Travel Screening     No screening recorded since 01/25/23 0000       Travel History   Travel since 12/26/22    No documented travel since 12/26/22

## 2023-04-24 DIAGNOSIS — E78.5 HYPERLIPIDEMIA, UNSPECIFIED: ICD-10-CM

## 2023-04-24 RX ORDER — SIMVASTATIN 20 MG
TABLET ORAL
Qty: 90 TABLET | Refills: 0 | Status: SHIPPED | OUTPATIENT
Start: 2023-04-24

## 2023-07-27 ENCOUNTER — OFFICE VISIT (OUTPATIENT)
Facility: CLINIC | Age: 43
End: 2023-07-27
Payer: COMMERCIAL

## 2023-07-27 VITALS
OXYGEN SATURATION: 97 % | HEIGHT: 68 IN | HEART RATE: 60 BPM | SYSTOLIC BLOOD PRESSURE: 120 MMHG | WEIGHT: 222 LBS | BODY MASS INDEX: 33.65 KG/M2 | DIASTOLIC BLOOD PRESSURE: 80 MMHG | RESPIRATION RATE: 18 BRPM

## 2023-07-27 DIAGNOSIS — E78.5 HYPERLIPIDEMIA, UNSPECIFIED: ICD-10-CM

## 2023-07-27 DIAGNOSIS — L30.4 INTERTRIGO: Primary | ICD-10-CM

## 2023-07-27 DIAGNOSIS — E78.5 HYPERLIPIDEMIA, UNSPECIFIED HYPERLIPIDEMIA TYPE: ICD-10-CM

## 2023-07-27 PROCEDURE — 99214 OFFICE O/P EST MOD 30 MIN: CPT | Performed by: INTERNAL MEDICINE

## 2023-07-27 RX ORDER — KETOCONAZOLE 20 MG/G
CREAM TOPICAL
Qty: 60 G | Refills: 1 | Status: SHIPPED | OUTPATIENT
Start: 2023-07-27

## 2023-07-27 RX ORDER — SIMVASTATIN 20 MG
20 TABLET ORAL NIGHTLY
Qty: 90 TABLET | Refills: 3 | Status: SHIPPED | OUTPATIENT
Start: 2023-07-27

## 2023-07-27 RX ORDER — SIMVASTATIN 20 MG
TABLET ORAL
Qty: 90 TABLET | Refills: 0 | OUTPATIENT
Start: 2023-07-27

## 2023-07-27 ASSESSMENT — ENCOUNTER SYMPTOMS
SHORTNESS OF BREATH: 0
DIARRHEA: 0
COUGH: 0
ABDOMINAL PAIN: 0
CONSTIPATION: 0

## 2023-07-27 NOTE — PROGRESS NOTES
ROOM # 10  Identified pt with two pt identifiers(name and ). Reviewed record in preparation for visit and have obtained necessary documentation. Chief Complaint   Patient presents with    Cholesterol Problem    New Patient    Rash     On low back       Letty Steward preferred language for health care discussion is english/other. Is the patient using any DME equipment during OV? no    Letty Steward is due for:  Health Maintenance Due   Topic    Varicella vaccine (1 of 2 - 2-dose childhood series)    COVID-19 Vaccine (3 - Booster for [employer] series)       Health Maintenance reviewed and discussed per provider  Please order/place referral if appropriate. 1. For patients aged 43-73: Has the patient had a colonoscopy? no  If the patient is female:    2. For patients aged 43-66: Has the patient had a mammogram within the past 2 years? no    3. For patients aged 21-65: Has the patient had a pap smear? no    Advance Directive:  1. Do you have an advance directive in place? Patient Reply:no    2. If not, would you like material regarding how to put one in place? Patient Reply:no    Coordination of Care:  1. Have you been to the ER, urgent care clinic since your last visit? Hospitalized since your last visit? Patient Reply:no    2. Have you seen or consulted any other health care providers outside of the [address] [place] [address] since your last visit? Include any pap smears or colon. Patient Reply:no    Patient is accompanied by  I have received verbal consent from Letty Steward to discuss any/all medical information while they are present in the room.     Depression Screening:  PHQ-9  2023   Little interest or pleasure in doing things 0   Little interest or pleasure in doing things -   Feeling down, depressed, or hopeless 0   PHQ-2 Score 0   Total Score PHQ 2 -   PHQ-9 Total Score 0           Recent Travel Screening and Travel History documentation     Travel Screening     No screening recorded since 23 0000

## 2023-11-16 ENCOUNTER — TELEPHONE (OUTPATIENT)
Age: 43
End: 2023-11-16

## 2023-11-16 NOTE — TELEPHONE ENCOUNTER
Patient is requesting his annual recertification for CPAP and supplies are needed; Hose, masks, filters and etc....   Patient  initial appt with Cinthya Santamaria is for 3.20.23        Contact# [phone]

## 2024-03-20 ENCOUNTER — OFFICE VISIT (OUTPATIENT)
Age: 44
End: 2024-03-20
Payer: COMMERCIAL

## 2024-03-20 VITALS
TEMPERATURE: 97 F | RESPIRATION RATE: 18 BRPM | OXYGEN SATURATION: 97 % | HEART RATE: 63 BPM | SYSTOLIC BLOOD PRESSURE: 126 MMHG | WEIGHT: 206 LBS | DIASTOLIC BLOOD PRESSURE: 81 MMHG | HEIGHT: 68 IN | BODY MASS INDEX: 31.22 KG/M2

## 2024-03-20 DIAGNOSIS — J30.1 SEASONAL ALLERGIC RHINITIS DUE TO POLLEN: ICD-10-CM

## 2024-03-20 DIAGNOSIS — G47.63 BRUXISM, SLEEP-RELATED: ICD-10-CM

## 2024-03-20 DIAGNOSIS — G47.33 OSA ON CPAP: Primary | ICD-10-CM

## 2024-03-20 PROCEDURE — 99204 OFFICE O/P NEW MOD 45 MIN: CPT | Performed by: OTOLARYNGOLOGY

## 2024-03-20 ASSESSMENT — SLEEP AND FATIGUE QUESTIONNAIRES
HOW LIKELY ARE YOU TO NOD OFF OR FALL ASLEEP WHILE WATCHING TV: SLIGHT CHANCE OF DOZING
HOW LIKELY ARE YOU TO NOD OFF OR FALL ASLEEP WHILE SITTING QUIETLY AFTER LUNCH WITHOUT ALCOHOL: WOULD NEVER DOZE
ESS TOTAL SCORE: 5
HOW LIKELY ARE YOU TO NOD OFF OR FALL ASLEEP WHILE SITTING AND READING: WOULD NEVER DOZE
HOW LIKELY ARE YOU TO NOD OFF OR FALL ASLEEP WHILE LYING DOWN TO REST IN THE AFTERNOON WHEN CIRCUMSTANCES PERMIT: HIGH CHANCE OF DOZING
NECK CIRCUMFERENCE (INCHES): 15
HOW LIKELY ARE YOU TO NOD OFF OR FALL ASLEEP IN A CAR, WHILE STOPPED FOR A FEW MINUTES IN TRAFFIC: WOULD NEVER DOZE
HOW LIKELY ARE YOU TO NOD OFF OR FALL ASLEEP WHEN YOU ARE A PASSENGER IN A CAR FOR AN HOUR WITHOUT A BREAK: SLIGHT CHANCE OF DOZING
HOW LIKELY ARE YOU TO NOD OFF OR FALL ASLEEP WHILE SITTING AND TALKING TO SOMEONE: WOULD NEVER DOZE

## 2024-03-20 ASSESSMENT — PATIENT HEALTH QUESTIONNAIRE - PHQ9
2. FEELING DOWN, DEPRESSED OR HOPELESS: NOT AT ALL
SUM OF ALL RESPONSES TO PHQ QUESTIONS 1-9: 0
SUM OF ALL RESPONSES TO PHQ9 QUESTIONS 1 & 2: 0
1. LITTLE INTEREST OR PLEASURE IN DOING THINGS: NOT AT ALL
SUM OF ALL RESPONSES TO PHQ QUESTIONS 1-9: 0

## 2024-03-20 NOTE — ASSESSMENT & PLAN NOTE
Original HSAT done 2019, AHI 16.8. O2 shea: 80%. Well controlled, residual AHI is low at 1.3.  His mask does not leak hardly at all and his compliance usage is 85% (see below about comments with respect to his travel CPAP). Continue current CPAP settings, which were increased to 15/9.5 at last visit.

## 2024-03-20 NOTE — PATIENT INSTRUCTIONS
Please make a follow up appointment to discuss the results of your sleep study. If this is impossible for some reason, please send me a \"My Chart\" message so that I may get back with you in a timely manner.    The [facility] Sleep Lab is located in the [facility], adjacent to [facility]. The lab is on the second floor. The direct number to call for sleep study related questions is: [phone].    Please call our clinic back at [phone] or send a message on [employer] if you have any questions or concerns or if you are experiencing any of the following:     You have not received a follow up appointment within 30 days prior the recommended follow up time.    If you are not tolerating treatment plan and/or not able to obtain equipment or prescribed medication(s).  if you are experiencing any difficulties with the Durable Medical Equipment  (DME) Company you may be using or is assigned to you.  Two weeks have passed and you have not received an appointment for a scheduled procedure.  Two weeks have passed since you underwent a test and/or procedure and you have not received your results.     If you are using a CPAP/BIPAP, or Home Ventilator Device- Please note the following.  Currently, many DMEs are experiencing supply chain difficulties and orders for equipment may be back logged several weeks.     Your  Durable Medical Equipment (DME ) company is supposed to provide you with replacement filters, tubing and masks. You can either call your DME when you need new supplies or you can arrange for an automatic shipment schedule.    Your need to be seen by our office at lat minimum of every 12 months in order to renew the prescription for these supplies.   Please make note of who your DME company is and their phone number.   Please make sure that you clean your mask and hosing on a regular basis.  Your DME can provide you with additional information regarding proper care and cleaning of your

## 2024-03-20 NOTE — PROGRESS NOTES
Herve Mcclure presents today for   Chief Complaint   Patient presents with    Sleep Apnea       Is someone accompanying this pt? no    Is the patient using any DME equipment during OV? no    -DME Company: [employer]    Have you ever had a sleep study done before? yes,     Depression Screening:      3/20/2024     9:34 AM   PHQ-9    Little interest or pleasure in doing things 0   Feeling down, depressed, or hopeless 0   PHQ-2 Score 0   PHQ-9 Total Score 0        [place] Sleepiness Scale:      3/20/2024     9:36 AM   Sleep Medicine   Sitting and reading 0   Watching TV 1   Sitting, inactive in a public place (e.g. a theatre or a meeting) 0   As a passenger in a car for an hour without a break 1   Lying down to rest in the afternoon when circumstances permit 3   Sitting and talking to someone 0   Sitting quietly after a lunch without alcohol 0   In a car, while stopped for a few minutes in traffic 0   [place] Sleepiness Score 5   Neck circumference (Inches) 15       Stop-Bang:      3/20/2024     9:00 AM   STOP-BANG QUESTIONNAIRE   Are you a loud and/or regular snorer? 0   Do you often feel tired or groggy upon awakening or do you awaken with a headache? 0   Have you been observed to gasp or stop breathing during sleep? 1   Are you often tired or fatigued during wake time hours?  0   Do you fall asleep sitting, reading, watching TV or driving? 0   Do you often have problems with memory or concentration? 0   Do you have or are you being treated for high blood pressure? 0   Recent BMI (Calculated) 33.8   Is BMI greater than 35 kg/m2? 0=No   Age older than 50 years old? 0=No   Is your neck circumference greater than 17 inches (Male) or 16 inches (Female)? 0   Gender - Male 1=Yes   STOP-Bang Total Score 35.8         Coordination of Care:  1. Have you been to the ER, urgent care clinic since your last visit? Hospitalized since your last visit? no    2. Have you seen or consulted any other health care providers outside of the [facility] 
Use Time  Leak-large  Notes              7/10-8/8/19  60 (63.3)  6.8  APAP 5/15  9.5  06:00:07  00:01:38  Has travel unit     6/12-/9/9/20  94.4 (96.7)  6.7  APAP 5/15  9.4  06:36:13  00:00:20       7/1/21-9/28/21  86.7 (86.7)  5.0  APAP 7/13  9.7  06:58:54  00:02:21       10/13/22-1/10/23  78.9 (78.9)  4.4  APAP 7/15 Flex:2  9.5  07:17:20  00:00:22      12/19/23-3/17/24 85% 1.3 APAP 15/9.5  Flex: 2 10 7:13 1 min, 38 sec                                         3/20/2024     9:36 AM 1/11/2023     8:00 AM 9/30/2021     8:00 AM   Sleep Questionnaire   Sitting and reading 0 0 0   Watching TV 1 1 1   Sitting, inactive in a public place (e.g. a theatre or a meeting) 0 0 0   As a passenger in a car for an hour without a break 1 1 2   Lying down to rest in the afternoon when circumstances permit 3 3 2   Sitting and talking to someone 0 0 0   Sitting quietly after a lunch without alcohol 0 0 0   In a car, while stopped for a few minutes in traffic 0 0 0   [place] Sleepiness Score 5 5 5     Which reflects no daytime somnolence        3/20/2024     9:34 AM   PHQ-9 Questionaire   Little interest or pleasure in doing things 0   Feeling down, depressed, or hopeless 0   PHQ-9 Total Score 0        Past Medical History:  Past Medical History:   Diagnosis Date    Elevated LDL cholesterol level     Sleep apnea, obstructive     with CPAP    Tinea corporis        Past Surgical History:  History reviewed. No pertinent surgical history.    Family History:  Family History   Problem Relation Age of Onset    No Known Problems Mother     High Cholesterol Father        Social History:  Social History     Tobacco Use    Smoking status: Never    Smokeless tobacco: Never   Substance Use Topics    Alcohol use: Yes     Alcohol/week: 2.0 standard drinks of alcohol    Drug use: Never        Medications:  Current Outpatient Medications on File Prior to Visit   Medication Sig Dispense Refill    simvastatin (ZOCOR) 20 MG tablet Take 1 tablet by mouth

## 2024-03-20 NOTE — ASSESSMENT & PLAN NOTE
Well-controlled, continue current medications and continue current treatment plan, wears a nightguard, no complaints today

## 2024-03-21 ENCOUNTER — CLINICAL DOCUMENTATION (OUTPATIENT)
Age: 44
End: 2024-03-21

## 2024-08-02 DIAGNOSIS — E78.5 HYPERLIPIDEMIA, UNSPECIFIED HYPERLIPIDEMIA TYPE: ICD-10-CM

## 2024-08-02 RX ORDER — SIMVASTATIN 20 MG
20 TABLET ORAL NIGHTLY
Qty: 30 TABLET | Refills: 0 | Status: SHIPPED | OUTPATIENT
Start: 2024-08-02

## 2024-08-02 NOTE — TELEPHONE ENCOUNTER
Last Appointment:  7/27/2023  Future Appointments   Date Time Provider Department Center   8/6/2024  8:00 AM Daquan Ordoñez MD [facility]

## 2024-08-06 ENCOUNTER — OFFICE VISIT (OUTPATIENT)
Facility: CLINIC | Age: 44
End: 2024-08-06
Payer: COMMERCIAL

## 2024-08-06 ENCOUNTER — HOSPITAL ENCOUNTER (OUTPATIENT)
Facility: HOSPITAL | Age: 44
Setting detail: SPECIMEN
Discharge: HOME | End: 2024-08-09
Payer: COMMERCIAL

## 2024-08-06 VITALS
HEIGHT: 68 IN | RESPIRATION RATE: 16 BRPM | TEMPERATURE: 97.3 F | SYSTOLIC BLOOD PRESSURE: 112 MMHG | DIASTOLIC BLOOD PRESSURE: 88 MMHG | OXYGEN SATURATION: 97 % | HEART RATE: 56 BPM | WEIGHT: 211.2 LBS | BODY MASS INDEX: 32.01 KG/M2

## 2024-08-06 DIAGNOSIS — L30.4 INTERTRIGO: ICD-10-CM

## 2024-08-06 DIAGNOSIS — Z13.0 SCREENING FOR IRON DEFICIENCY ANEMIA: ICD-10-CM

## 2024-08-06 DIAGNOSIS — Z13.29 SCREENING FOR THYROID DISORDER: ICD-10-CM

## 2024-08-06 DIAGNOSIS — E78.5 HYPERLIPIDEMIA, UNSPECIFIED HYPERLIPIDEMIA TYPE: ICD-10-CM

## 2024-08-06 DIAGNOSIS — Z13.1 SCREENING FOR DIABETES MELLITUS (DM): ICD-10-CM

## 2024-08-06 DIAGNOSIS — Z00.00 ANNUAL PHYSICAL EXAM: Primary | ICD-10-CM

## 2024-08-06 DIAGNOSIS — Z13.228 SCREENING FOR METABOLIC DISORDER: ICD-10-CM

## 2024-08-06 LAB
ALBUMIN SERPL-MCNC: 3.9 G/DL (ref 3.4–5)
ALBUMIN/GLOB SERPL: 1.3 (ref 0.8–1.7)
ALP SERPL-CCNC: 47 U/L (ref 45–117)
ALT SERPL-CCNC: 51 U/L (ref 16–61)
ANION GAP SERPL CALC-SCNC: 5 MMOL/L (ref 3–18)
AST SERPL-CCNC: 23 U/L (ref 10–38)
BASOPHILS # BLD: 0 K/UL (ref 0–0.1)
BASOPHILS NFR BLD: 0 % (ref 0–2)
BILIRUB SERPL-MCNC: 0.7 MG/DL (ref 0.2–1)
BUN SERPL-MCNC: 14 MG/DL (ref 7–18)
BUN/CREAT SERPL: 14 (ref 12–20)
CALCIUM SERPL-MCNC: 9.2 MG/DL (ref 8.5–10.1)
CHLORIDE SERPL-SCNC: 106 MMOL/L (ref 100–111)
CHOLEST SERPL-MCNC: 157 MG/DL
CO2 SERPL-SCNC: 29 MMOL/L (ref 21–32)
CREAT SERPL-MCNC: 0.98 MG/DL (ref 0.6–1.3)
DIFFERENTIAL METHOD BLD: ABNORMAL
EOSINOPHIL # BLD: 0.1 K/UL (ref 0–0.4)
EOSINOPHIL NFR BLD: 1 % (ref 0–5)
ERYTHROCYTE [DISTWIDTH] IN BLOOD BY AUTOMATED COUNT: 12.3 % (ref 11.6–14.5)
EST. AVERAGE GLUCOSE BLD GHB EST-MCNC: 103 MG/DL
GLOBULIN SER CALC-MCNC: 3 G/DL (ref 2–4)
GLUCOSE SERPL-MCNC: 99 MG/DL (ref 74–99)
HBA1C MFR BLD: 5.2 % (ref 4.2–5.6)
HCT VFR BLD AUTO: 43.4 % (ref 36–48)
HDLC SERPL-MCNC: 57 MG/DL (ref 40–60)
HDLC SERPL: 2.8 (ref 0–5)
HGB BLD-MCNC: 14.7 G/DL (ref 13–16)
IMM GRANULOCYTES # BLD AUTO: 0 K/UL (ref 0–0.04)
IMM GRANULOCYTES NFR BLD AUTO: 0 % (ref 0–0.5)
LDLC SERPL CALC-MCNC: 79.4 MG/DL (ref 0–100)
LIPID PANEL: NORMAL
LYMPHOCYTES # BLD: 1.1 K/UL (ref 0.9–3.6)
LYMPHOCYTES NFR BLD: 19 % (ref 21–52)
MCH RBC QN AUTO: 30.8 PG (ref 24–34)
MCHC RBC AUTO-ENTMCNC: 33.9 G/DL (ref 31–37)
MCV RBC AUTO: 91 FL (ref 78–100)
MONOCYTES # BLD: 0.4 K/UL (ref 0.05–1.2)
MONOCYTES NFR BLD: 7 % (ref 3–10)
NEUTS SEG # BLD: 4.4 K/UL (ref 1.8–8)
NEUTS SEG NFR BLD: 72 % (ref 40–73)
NRBC # BLD: 0 K/UL (ref 0–0.01)
NRBC BLD-RTO: 0 PER 100 WBC
PLATELET # BLD AUTO: 222 K/UL (ref 135–420)
PMV BLD AUTO: 10.4 FL (ref 9.2–11.8)
POTASSIUM SERPL-SCNC: 4.6 MMOL/L (ref 3.5–5.5)
PROT SERPL-MCNC: 6.9 G/DL (ref 6.4–8.2)
RBC # BLD AUTO: 4.77 M/UL (ref 4.35–5.65)
SODIUM SERPL-SCNC: 140 MMOL/L (ref 136–145)
T4 FREE SERPL-MCNC: 0.9 NG/DL (ref 0.7–1.5)
TRIGL SERPL-MCNC: 103 MG/DL
TSH SERPL DL<=0.05 MIU/L-ACNC: 0.84 UIU/ML (ref 0.36–3.74)
VLDLC SERPL CALC-MCNC: 20.6 MG/DL
WBC # BLD AUTO: 6.1 K/UL (ref 4.6–13.2)

## 2024-08-06 PROCEDURE — 99213 OFFICE O/P EST LOW 20 MIN: CPT | Performed by: INTERNAL MEDICINE

## 2024-08-06 PROCEDURE — 84439 ASSAY OF FREE THYROXINE: CPT

## 2024-08-06 PROCEDURE — 80053 COMPREHEN METABOLIC PANEL: CPT

## 2024-08-06 PROCEDURE — 36415 COLL VENOUS BLD VENIPUNCTURE: CPT

## 2024-08-06 PROCEDURE — 85025 COMPLETE CBC W/AUTO DIFF WBC: CPT

## 2024-08-06 PROCEDURE — 80061 LIPID PANEL: CPT

## 2024-08-06 PROCEDURE — 84443 ASSAY THYROID STIM HORMONE: CPT

## 2024-08-06 PROCEDURE — 99396 PREV VISIT EST AGE 40-64: CPT | Performed by: INTERNAL MEDICINE

## 2024-08-06 PROCEDURE — 83036 HEMOGLOBIN GLYCOSYLATED A1C: CPT

## 2024-08-06 RX ORDER — SIMVASTATIN 20 MG
20 TABLET ORAL NIGHTLY
Qty: 90 TABLET | Refills: 1 | Status: SHIPPED | OUTPATIENT
Start: 2024-08-06

## 2024-08-06 RX ORDER — CLOTRIMAZOLE AND BETAMETHASONE DIPROPIONATE 10; .64 MG/G; MG/G
CREAM TOPICAL
Qty: 45 G | Refills: 1 | Status: SHIPPED | OUTPATIENT
Start: 2024-08-06

## 2024-08-06 SDOH — SOCIAL STABILITY: SOCIAL NETWORK: ARE YOU MARRIED, WIDOWED, DIVORCED, SEPARATED, NEVER MARRIED, OR LIVING WITH A PARTNER?: NEVER MARRIED

## 2024-08-06 SDOH — ECONOMIC STABILITY: FOOD INSECURITY: WITHIN THE PAST 12 MONTHS, YOU WORRIED THAT YOUR FOOD WOULD RUN OUT BEFORE YOU GOT MONEY TO BUY MORE.: NEVER TRUE

## 2024-08-06 SDOH — HEALTH STABILITY: MENTAL HEALTH
STRESS IS WHEN SOMEONE FEELS TENSE, NERVOUS, ANXIOUS, OR CAN'T SLEEP AT NIGHT BECAUSE THEIR MIND IS TROUBLED. HOW STRESSED ARE YOU?: NOT AT ALL

## 2024-08-06 SDOH — ECONOMIC STABILITY: HOUSING INSECURITY
IN THE LAST 12 MONTHS, WAS THERE A TIME WHEN YOU DID NOT HAVE A STEADY PLACE TO SLEEP OR SLEPT IN A SHELTER (INCLUDING NOW)?: NO

## 2024-08-06 SDOH — SOCIAL STABILITY: SOCIAL NETWORK
DO YOU BELONG TO ANY CLUBS OR ORGANIZATIONS SUCH AS CHURCH GROUPS UNIONS, FRATERNAL OR ATHLETIC GROUPS, OR SCHOOL GROUPS?: NO

## 2024-08-06 SDOH — HEALTH STABILITY: MENTAL HEALTH: HOW OFTEN DO YOU HAVE A DRINK CONTAINING ALCOHOL?: 4 OR MORE TIMES A WEEK

## 2024-08-06 SDOH — SOCIAL STABILITY: SOCIAL NETWORK: HOW OFTEN DO YOU GET TOGETHER WITH FRIENDS OR RELATIVES?: MORE THAN THREE TIMES A WEEK

## 2024-08-06 SDOH — HEALTH STABILITY: PHYSICAL HEALTH: ON AVERAGE, HOW MANY MINUTES DO YOU ENGAGE IN EXERCISE AT THIS LEVEL?: 30 MIN

## 2024-08-06 SDOH — ECONOMIC STABILITY: TRANSPORTATION INSECURITY
IN THE PAST 12 MONTHS, HAS LACK OF TRANSPORTATION KEPT YOU FROM MEETINGS, WORK, OR FROM GETTING THINGS NEEDED FOR DAILY LIVING?: NO

## 2024-08-06 SDOH — SOCIAL STABILITY: SOCIAL NETWORK
IN A TYPICAL WEEK, HOW MANY TIMES DO YOU TALK ON THE PHONE WITH FAMILY, FRIENDS, OR NEIGHBORS?: MORE THAN THREE TIMES A WEEK

## 2024-08-06 SDOH — SOCIAL STABILITY: SOCIAL INSECURITY
WITHIN THE LAST YEAR, HAVE TO BEEN RAPED OR FORCED TO HAVE ANY KIND OF SEXUAL ACTIVITY BY YOUR PARTNER OR EX-PARTNER?: NO

## 2024-08-06 SDOH — SOCIAL STABILITY: SOCIAL INSECURITY
WITHIN THE LAST YEAR, HAVE YOU BEEN KICKED, HIT, SLAPPED, OR OTHERWISE PHYSICALLY HURT BY YOUR PARTNER OR EX-PARTNER?: NO

## 2024-08-06 SDOH — ECONOMIC STABILITY: FOOD INSECURITY: WITHIN THE PAST 12 MONTHS, THE FOOD YOU BOUGHT JUST DIDN'T LAST AND YOU DIDN'T HAVE MONEY TO GET MORE.: NEVER TRUE

## 2024-08-06 SDOH — SOCIAL STABILITY: SOCIAL INSECURITY: WITHIN THE LAST YEAR, HAVE YOU BEEN HUMILIATED OR EMOTIONALLY ABUSED IN OTHER WAYS BY YOUR PARTNER OR EX-PARTNER?: NO

## 2024-08-06 SDOH — ECONOMIC STABILITY: INCOME INSECURITY: IN THE LAST 12 MONTHS, WAS THERE A TIME WHEN YOU WERE NOT ABLE TO PAY THE MORTGAGE OR RENT ON TIME?: NO

## 2024-08-06 SDOH — SOCIAL STABILITY: SOCIAL INSECURITY: WITHIN THE LAST YEAR, HAVE YOU BEEN AFRAID OF YOUR PARTNER OR EX-PARTNER?: NO

## 2024-08-06 SDOH — HEALTH STABILITY: MENTAL HEALTH: HOW MANY STANDARD DRINKS CONTAINING ALCOHOL DO YOU HAVE ON A TYPICAL DAY?: PATIENT DOES NOT DRINK

## 2024-08-06 SDOH — HEALTH STABILITY: PHYSICAL HEALTH: ON AVERAGE, HOW MANY DAYS PER WEEK DO YOU ENGAGE IN MODERATE TO STRENUOUS EXERCISE (LIKE A BRISK WALK)?: 3 DAYS

## 2024-08-06 SDOH — ECONOMIC STABILITY: INCOME INSECURITY: HOW HARD IS IT FOR YOU TO PAY FOR THE VERY BASICS LIKE FOOD, HOUSING, MEDICAL CARE, AND HEATING?: NOT HARD AT ALL

## 2024-08-06 SDOH — SOCIAL STABILITY: SOCIAL NETWORK: HOW OFTEN DO YOU ATTENT MEETINGS OF THE CLUB OR ORGANIZATION YOU BELONG TO?: NEVER

## 2024-08-06 SDOH — ECONOMIC STABILITY: TRANSPORTATION INSECURITY
IN THE PAST 12 MONTHS, HAS THE LACK OF TRANSPORTATION KEPT YOU FROM MEDICAL APPOINTMENTS OR FROM GETTING MEDICATIONS?: NO

## 2024-08-06 SDOH — SOCIAL STABILITY: SOCIAL NETWORK: HOW OFTEN DO YOU ATTEND CHURCH OR RELIGIOUS SERVICES?: MORE THAN 4 TIMES PER YEAR

## 2024-08-06 ASSESSMENT — ENCOUNTER SYMPTOMS
CONSTIPATION: 0
COUGH: 0
DIARRHEA: 0
ABDOMINAL PAIN: 0
SHORTNESS OF BREATH: 0

## 2024-08-06 NOTE — PROGRESS NOTES
Subjective:      Patient ID: Herve Mcclure is a 44 y.o. male.    Annual physical    Patient patient has small localized rash in his right buttock.  He presents off-and-on, itchy.  Findings consistent with intertrigo, possibly tinea corporis.  I will send clotrimazole/betamethasone cream for couple of weeks and zinc oxide.  Referral to dermatology, provided contact information, he will make appointment.  Denies fever, chills, sweats, chest pain or shortness of breath.  Denies GI symptoms, no blood in stools.  Patient is taking his statins for high cholesterol, so far well-tolerated.  Will check LFTs and cholesterol panel.  Continue to follow-up with sleep medicine for sleep apnea.  He has no other complaints or concerns      Hyperlipidemia  Simvastatin 20 mg daily.    Obesity class I  BMI 33.7.  Patient educated about lifestyle changes, diet and physical activity.    MORELIA  On CPAP.      PAST MEDICAL HISTORY  Medical: HLD, MORELIA.  Surgical: denied.  Allergies: denied.  Medication: as listed.  Family: dad cancer mesothelioma.  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  Social: tobacco denied, [place] occasionally, recreational drugs denied.  Sexual: single, no children, STI denied.    Rash  Pertinent negatives include no cough, diarrhea, fever or shortness of breath.       Review of Systems   Constitutional:  Negative for chills and fever.   Respiratory:  Negative for cough and shortness of breath.    Cardiovascular:  Negative for chest pain.   Gastrointestinal:  Negative for abdominal pain, constipation and diarrhea.   Genitourinary:  Negative for difficulty urinating.   Skin:  Positive for rash.   Neurological:  Negative for headaches.       Objective:   Visit Vitals  /88 (Site: Left Upper Arm, Position: Sitting)   Pulse 56   Temp 97.3 °F (36.3 °C) (Temporal)   Resp 16   Ht 1.727 m (5' 8\")   Wt 95.8 kg (211 lb 3.2 oz)   SpO2 97%   BMI 32.11 kg/m²        Physical Exam  Constitutional:       Appearance: Normal appearance.   HENT:

## 2025-03-02 DIAGNOSIS — E78.5 HYPERLIPIDEMIA, UNSPECIFIED HYPERLIPIDEMIA TYPE: ICD-10-CM

## 2025-03-04 DIAGNOSIS — E78.5 HYPERLIPIDEMIA, UNSPECIFIED HYPERLIPIDEMIA TYPE: ICD-10-CM

## 2025-03-04 RX ORDER — SIMVASTATIN 20 MG
20 TABLET ORAL NIGHTLY
Qty: 90 TABLET | Refills: 1 | OUTPATIENT
Start: 2025-03-04

## 2025-03-04 NOTE — TELEPHONE ENCOUNTER
Mr. Mcclure is requesting refills of:    simvastatin (ZOCOR) 20 MG tablet         to be sent to   [facility]/pharmacy #[id number] - [place], [place] - [address] - P [phone] - F [phone]  [address] [facility] [address]  Phone: [phone] Fax: [phone]  .     LAST OFFICE VISIT:  8/6/2024     UPCOMING APPOINTMENT(S):  Future Appointments   Date Time Provider Department Center   8/8/2025  8:00 AM Daquan Ordoñez MD [facility]       Patient offered an appointment? yes -   How much medication does the patient have on hand?0    Provided notified

## 2025-03-05 RX ORDER — SIMVASTATIN 20 MG
20 TABLET ORAL NIGHTLY
Qty: 90 TABLET | Refills: 1 | Status: SHIPPED | OUTPATIENT
Start: 2025-03-05

## 2025-07-09 ASSESSMENT — SLEEP AND FATIGUE QUESTIONNAIRES
HOW LIKELY ARE YOU TO NOD OFF OR FALL ASLEEP WHILE WATCHING TV: SLIGHT CHANCE OF DOZING
HOW LIKELY ARE YOU TO NOD OFF OR FALL ASLEEP WHILE SITTING AND TALKING TO SOMEONE: WOULD NEVER DOZE
HOW LIKELY ARE YOU TO NOD OFF OR FALL ASLEEP WHILE SITTING INACTIVE IN A PUBLIC PLACE: WOULD NEVER DOZE
HOW LIKELY ARE YOU TO NOD OFF OR FALL ASLEEP WHILE SITTING AND READING: WOULD NEVER DOZE
HOW LIKELY ARE YOU TO NOD OFF OR FALL ASLEEP WHILE SITTING AND TALKING TO SOMEONE: WOULD NEVER DOZE
ESS TOTAL SCORE: 4
HOW LIKELY ARE YOU TO NOD OFF OR FALL ASLEEP WHILE SITTING QUIETLY AFTER LUNCH WITHOUT ALCOHOL: WOULD NEVER DOZE
HOW LIKELY ARE YOU TO NOD OFF OR FALL ASLEEP WHILE SITTING INACTIVE IN A PUBLIC PLACE: WOULD NEVER DOZE
HOW LIKELY ARE YOU TO NOD OFF OR FALL ASLEEP WHEN YOU ARE A PASSENGER IN A CAR FOR AN HOUR WITHOUT A BREAK: SLIGHT CHANCE OF DOZING
HOW LIKELY ARE YOU TO NOD OFF OR FALL ASLEEP WHILE LYING DOWN TO REST IN THE AFTERNOON WHEN CIRCUMSTANCES PERMIT: MODERATE CHANCE OF DOZING
HOW LIKELY ARE YOU TO NOD OFF OR FALL ASLEEP WHEN YOU ARE A PASSENGER IN A CAR FOR AN HOUR WITHOUT A BREAK: SLIGHT CHANCE OF DOZING
HOW LIKELY ARE YOU TO NOD OFF OR FALL ASLEEP WHILE LYING DOWN TO REST IN THE AFTERNOON WHEN CIRCUMSTANCES PERMIT: MODERATE CHANCE OF DOZING
HOW LIKELY ARE YOU TO NOD OFF OR FALL ASLEEP WHILE WATCHING TV: SLIGHT CHANCE OF DOZING
HOW LIKELY ARE YOU TO NOD OFF OR FALL ASLEEP IN A CAR, WHILE STOPPED FOR A FEW MINUTES IN TRAFFIC: WOULD NEVER DOZE
HOW LIKELY ARE YOU TO NOD OFF OR FALL ASLEEP WHILE SITTING AND READING: WOULD NEVER DOZE
HOW LIKELY ARE YOU TO NOD OFF OR FALL ASLEEP WHILE SITTING QUIETLY AFTER LUNCH WITHOUT ALCOHOL: WOULD NEVER DOZE
HOW LIKELY ARE YOU TO NOD OFF OR FALL ASLEEP IN A CAR, WHILE STOPPED FOR A FEW MINUTES IN TRAFFIC: WOULD NEVER DOZE

## 2025-07-10 PROBLEM — G47.33 OSA (OBSTRUCTIVE SLEEP APNEA): Status: ACTIVE | Noted: 2021-01-15

## 2025-07-10 NOTE — PATIENT INSTRUCTIONS
Please make a follow up appointment to discuss the results of your sleep study or I will send you a \"my chart\" message with your results and treatment options.     The [facility] Sleep Lab is located in the [facility] adjacent to [facility]. The lab is on the second floor. The direct number to call for sleep study related questions is: [phone].    Please call our clinic back at [phone] or send a message on [employer] if you have any questions or concerns or if you are experiencing any of the following:     You have not received a follow up appointment within 30 days prior the recommended follow up time.    If you are not tolerating treatment plan and/or not able to obtain equipment or prescribed medication(s).  if you are experiencing any difficulties with the Durable Medical Equipment  (DME) Company you may be using or is assigned to you.  Two weeks have passed and you have not received an appointment for a scheduled procedure.  Two weeks have passed since you underwent a test and/or procedure and you have not received your results.  Your  Durable Medical Equipment (DME ) company is supposed to provide you with replacement filters, tubing and masks. You can either call your DME when you need new supplies or you can arrange for an automatic shipment schedule.    Your need to be seen by our office at lat minimum of every 12 months in order to renew the prescription for these supplies.   Please make note of who your DME company is and their phone number.   Please make sure that you clean your mask and hosing on a regular basis.  Your DME can provide you with additional information regarding proper care and cleaning of your device     Safety is strongly encouraged.  You should not drive if sleepy, tired, distracted and/or fatigued.      Chest Xrays and blood work do not require appointments.  They are considered \"Walk-In\" services and can be obtained at either [facility] or [facility]

## 2025-07-11 ENCOUNTER — OFFICE VISIT (OUTPATIENT)
Age: 45
End: 2025-07-11
Payer: COMMERCIAL

## 2025-07-11 ENCOUNTER — CLINICAL DOCUMENTATION (OUTPATIENT)
Age: 45
End: 2025-07-11

## 2025-07-11 VITALS
TEMPERATURE: 97.8 F | RESPIRATION RATE: 19 BRPM | SYSTOLIC BLOOD PRESSURE: 128 MMHG | DIASTOLIC BLOOD PRESSURE: 85 MMHG | WEIGHT: 221 LBS | BODY MASS INDEX: 33.49 KG/M2 | HEART RATE: 71 BPM | HEIGHT: 68 IN | OXYGEN SATURATION: 96 %

## 2025-07-11 DIAGNOSIS — G47.33 OSA (OBSTRUCTIVE SLEEP APNEA): Primary | ICD-10-CM

## 2025-07-11 DIAGNOSIS — J30.1 SEASONAL ALLERGIC RHINITIS DUE TO POLLEN: ICD-10-CM

## 2025-07-11 PROCEDURE — 99214 OFFICE O/P EST MOD 30 MIN: CPT | Performed by: OTOLARYNGOLOGY

## 2025-07-11 ASSESSMENT — PATIENT HEALTH QUESTIONNAIRE - PHQ9
2. FEELING DOWN, DEPRESSED OR HOPELESS: NOT AT ALL
1. LITTLE INTEREST OR PLEASURE IN DOING THINGS: NOT AT ALL
SUM OF ALL RESPONSES TO PHQ QUESTIONS 1-9: 0

## 2025-07-11 NOTE — PROGRESS NOTES
Herve Mcclure presents today for   Chief Complaint   Patient presents with    Follow-up     CPAP.       Is someone accompanying this pt? no    Is the patient using any DME equipment during OV? no    -DME Company: [employer]     Depression Screenin/11/2025     8:18 AM   PHQ-9    Little interest or pleasure in doing things 0   Feeling down, depressed, or hopeless 0   PHQ-2 Score 0   PHQ-9 Total Score 0        [place] Sleepiness Scale:      2025    11:08 AM   Sleep Medicine   Sitting and reading 0   Watching TV 1   Sitting, inactive in a public place (e.g. a theatre or a meeting) 0   As a passenger in a car for an hour without a break 1   Lying down to rest in the afternoon when circumstances permit 2   Sitting and talking to someone 0   Sitting quietly after a lunch without alcohol 0   In a car, while stopped for a few minutes in traffic 0   [place] Sleepiness Score 4        Patient-reported       Stop-Bang:      3/20/2024     9:00 AM   STOP-BANG QUESTIONNAIRE   Are you a loud and/or regular snorer? 0   Do you often feel tired or groggy upon awakening or do you awaken with a headache? 0   Have you been observed to gasp or stop breathing during sleep? 1   Are you often tired or fatigued during wake time hours?  0   Do you fall asleep sitting, reading, watching TV or driving? 0   Do you often have problems with memory or concentration? 0   Do you have or are you being treated for high blood pressure? 0   Recent BMI (Calculated) 33.8   Is BMI greater than 35 kg/m2? 0=No   Age older than 50 years old? 0=No   Is your neck circumference greater than 17 inches (Male) or 16 inches (Female)? 0   Gender - Male 1=Yes   STOP-Bang Total Score 35.8           Coordination of Care:  1. Have you been to the ER, urgent care clinic since your last visit? Hospitalized since your last visit?  no    2. Have you seen or consulted any other health care providers outside of the [facility] since your last visit? Include any 
inter and intra continental - currently working more from home           Work Schedule: 0630- 1530   Shift work: No       Positive Airway Pressure (PAP) Compliance  Report & Summary Trends   DME: [facility]   Device: 1) Refurbished DS (Post Recall) Home Device  2) Travel Unit- still under recall ,awaiting replacement   Mask: Nasal Pocket Dreamwear 5/10/2019            Date  >4 hr use % Time   (Total Time%)  AHI  PAP Rx  Pressure @ 90%  Average Use Time  Leak-large  Notes      7/10-8/8/19  60 (63.3)  6.8  APAP 5/15  9.5  06:00:07  00:01:38  Has travel unit     6/12-/9/9/20  94.4 (96.7)  6.7  APAP 5/15  9.4  06:36:13  00:00:20     7/1/21-9/28/21  86.7 (86.7)  5.0  APAP 7/13  9.7  06:58:54  00:02:21     10/13/22-1/10/23  78.9 (78.9)  4.4  APAP 7/15 Flex:2  9.5  07:17:20  00:00:22     12/19/23-3/17/24 85% 1.3 APAP 15/9.5  Flex: 2 10 7:13 1 min, 38 sec     4/11/25-7/9/25  80% 2.5 9.5-15 10.5 7:24 1:10           Has travel Dreamstation as well        7/9/2025    11:08 AM 3/20/2024     9:36 AM 1/11/2023     8:00 AM 9/30/2021     8:00 AM   Sleep Questionnaire   Sitting and reading 0 0 0 0   Watching TV 1 1 1 1   Sitting, inactive in a public place (e.g. a theatre or a meeting) 0 0 0 0   As a passenger in a car for an hour without a break 1 1 1 2   Lying down to rest in the afternoon when circumstances permit 2 3 3 2   Sitting and talking to someone 0 0 0 0   Sitting quietly after a lunch without alcohol 0 0 0 0   In a car, while stopped for a few minutes in traffic 0 0 0 0   [place] Sleepiness Score 4  5 5 5       Patient-reported     Which reflects no daytime somnolence        7/11/2025     8:18 AM   PHQ-9 Questionaire   Little interest or pleasure in doing things 0   Feeling down, depressed, or hopeless 0   PHQ-9 Total Score 0        Past Medical History:  Past Medical History:   Diagnosis Date    Elevated LDL cholesterol level     Sleep apnea, obstructive     with CPAP    Tinea corporis        Past Surgical

## 2025-08-08 ENCOUNTER — HOSPITAL ENCOUNTER (OUTPATIENT)
Facility: HOSPITAL | Age: 45
Setting detail: SPECIMEN
Discharge: HOME | End: 2025-08-11
Payer: COMMERCIAL

## 2025-08-08 ENCOUNTER — OFFICE VISIT (OUTPATIENT)
Facility: CLINIC | Age: 45
End: 2025-08-08
Payer: COMMERCIAL

## 2025-08-08 VITALS
TEMPERATURE: 97.2 F | WEIGHT: 218 LBS | SYSTOLIC BLOOD PRESSURE: 122 MMHG | HEART RATE: 57 BPM | HEIGHT: 68 IN | DIASTOLIC BLOOD PRESSURE: 83 MMHG | RESPIRATION RATE: 17 BRPM | BODY MASS INDEX: 33.04 KG/M2 | OXYGEN SATURATION: 95 %

## 2025-08-08 DIAGNOSIS — Z13.228 SCREENING FOR METABOLIC DISORDER: ICD-10-CM

## 2025-08-08 DIAGNOSIS — Z13.1 SCREENING FOR DIABETES MELLITUS (DM): ICD-10-CM

## 2025-08-08 DIAGNOSIS — Z13.6 ENCOUNTER FOR SCREENING FOR CORONARY ARTERY DISEASE: ICD-10-CM

## 2025-08-08 DIAGNOSIS — Z13.0 SCREENING FOR IRON DEFICIENCY ANEMIA: ICD-10-CM

## 2025-08-08 DIAGNOSIS — E78.5 HYPERLIPIDEMIA, UNSPECIFIED HYPERLIPIDEMIA TYPE: ICD-10-CM

## 2025-08-08 DIAGNOSIS — Z00.00 ANNUAL PHYSICAL EXAM: Primary | ICD-10-CM

## 2025-08-08 LAB
ALBUMIN SERPL-MCNC: 4.1 G/DL (ref 3.4–5)
ALBUMIN/GLOB SERPL: 1.5 (ref 0.8–1.7)
ALP SERPL-CCNC: 49 U/L (ref 45–117)
ALT SERPL-CCNC: 59 U/L (ref 10–50)
ANION GAP SERPL CALC-SCNC: 12 MMOL/L (ref 3–18)
AST SERPL-CCNC: 31 U/L (ref 10–38)
BASOPHILS # BLD: 0.02 K/UL (ref 0–0.1)
BASOPHILS NFR BLD: 0.3 % (ref 0–2)
BILIRUB SERPL-MCNC: 0.6 MG/DL (ref 0.2–1)
BUN SERPL-MCNC: 16 MG/DL (ref 6–23)
BUN/CREAT SERPL: 16 (ref 12–20)
CALCIUM SERPL-MCNC: 9.9 MG/DL (ref 8.5–10.1)
CHLORIDE SERPL-SCNC: 104 MMOL/L (ref 98–107)
CHOLEST SERPL-MCNC: 151 MG/DL
CO2 SERPL-SCNC: 24 MMOL/L (ref 21–32)
CREAT SERPL-MCNC: 1 MG/DL (ref 0.6–1.3)
DIFFERENTIAL METHOD BLD: ABNORMAL
EOSINOPHIL # BLD: 0.06 K/UL (ref 0–0.4)
EOSINOPHIL NFR BLD: 1 % (ref 0–5)
ERYTHROCYTE [DISTWIDTH] IN BLOOD BY AUTOMATED COUNT: 12.1 % (ref 11.6–14.5)
EST. AVERAGE GLUCOSE BLD GHB EST-MCNC: 101 MG/DL
GLOBULIN SER CALC-MCNC: 2.7 G/DL (ref 2–4)
GLUCOSE SERPL-MCNC: 84 MG/DL (ref 74–108)
HBA1C MFR BLD: 5.2 % (ref 4.2–5.6)
HCT VFR BLD AUTO: 43 % (ref 36–48)
HDLC SERPL-MCNC: 49 MG/DL (ref 40–60)
HDLC SERPL: 3.1 (ref 0–5)
HGB BLD-MCNC: 14.6 G/DL (ref 13–16)
IMM GRANULOCYTES # BLD AUTO: 0.01 K/UL (ref 0–0.04)
IMM GRANULOCYTES NFR BLD AUTO: 0.2 % (ref 0–0.5)
LDLC SERPL CALC-MCNC: 88 MG/DL (ref 0–100)
LYMPHOCYTES # BLD: 1.15 K/UL (ref 0.9–3.6)
LYMPHOCYTES NFR BLD: 19.6 % (ref 21–52)
MCH RBC QN AUTO: 30 PG (ref 24–34)
MCHC RBC AUTO-ENTMCNC: 34 G/DL (ref 31–37)
MCV RBC AUTO: 88.5 FL (ref 78–100)
MONOCYTES # BLD: 0.38 K/UL (ref 0.05–1.2)
MONOCYTES NFR BLD: 6.5 % (ref 3–10)
NEUTS SEG # BLD: 4.26 K/UL (ref 1.8–8)
NEUTS SEG NFR BLD: 72.4 % (ref 40–73)
NRBC # BLD: 0 K/UL (ref 0–0.01)
NRBC BLD-RTO: 0 PER 100 WBC
PLATELET # BLD AUTO: 248 K/UL (ref 135–420)
PMV BLD AUTO: 10.5 FL (ref 9.2–11.8)
POTASSIUM SERPL-SCNC: 4.5 MMOL/L (ref 3.5–5.5)
PROT SERPL-MCNC: 6.8 G/DL (ref 6.4–8.2)
RBC # BLD AUTO: 4.86 M/UL (ref 4.35–5.65)
SODIUM SERPL-SCNC: 139 MMOL/L (ref 136–145)
TRIGL SERPL-MCNC: 70 MG/DL (ref 0–150)
VLDLC SERPL CALC-MCNC: 14 MG/DL
WBC # BLD AUTO: 5.9 K/UL (ref 4.6–13.2)

## 2025-08-08 PROCEDURE — 36415 COLL VENOUS BLD VENIPUNCTURE: CPT

## 2025-08-08 PROCEDURE — 99213 OFFICE O/P EST LOW 20 MIN: CPT | Performed by: INTERNAL MEDICINE

## 2025-08-08 PROCEDURE — 99396 PREV VISIT EST AGE 40-64: CPT | Performed by: INTERNAL MEDICINE

## 2025-08-08 PROCEDURE — 83036 HEMOGLOBIN GLYCOSYLATED A1C: CPT

## 2025-08-08 PROCEDURE — 85025 COMPLETE CBC W/AUTO DIFF WBC: CPT

## 2025-08-08 PROCEDURE — 80061 LIPID PANEL: CPT

## 2025-08-08 PROCEDURE — 80053 COMPREHEN METABOLIC PANEL: CPT

## 2025-08-08 RX ORDER — SIMVASTATIN 20 MG
20 TABLET ORAL NIGHTLY
Qty: 90 TABLET | Refills: 3 | Status: SHIPPED | OUTPATIENT
Start: 2025-08-08

## 2025-08-08 SDOH — ECONOMIC STABILITY: FOOD INSECURITY: WITHIN THE PAST 12 MONTHS, THE FOOD YOU BOUGHT JUST DIDN'T LAST AND YOU DIDN'T HAVE MONEY TO GET MORE.: NEVER TRUE

## 2025-08-08 SDOH — ECONOMIC STABILITY: FOOD INSECURITY: WITHIN THE PAST 12 MONTHS, YOU WORRIED THAT YOUR FOOD WOULD RUN OUT BEFORE YOU GOT MONEY TO BUY MORE.: NEVER TRUE

## 2025-08-08 ASSESSMENT — ENCOUNTER SYMPTOMS
COUGH: 0
ABDOMINAL PAIN: 0
DIARRHEA: 0
CONSTIPATION: 0
BLOOD IN STOOL: 0
SHORTNESS OF BREATH: 0